# Patient Record
Sex: FEMALE | Race: WHITE | NOT HISPANIC OR LATINO
[De-identification: names, ages, dates, MRNs, and addresses within clinical notes are randomized per-mention and may not be internally consistent; named-entity substitution may affect disease eponyms.]

---

## 2017-03-22 ENCOUNTER — RESULT CHARGE (OUTPATIENT)
Age: 77
End: 2017-03-22

## 2017-03-22 ENCOUNTER — APPOINTMENT (OUTPATIENT)
Dept: UROGYNECOLOGY | Facility: CLINIC | Age: 77
End: 2017-03-22

## 2017-03-22 VITALS
WEIGHT: 145 LBS | DIASTOLIC BLOOD PRESSURE: 78 MMHG | BODY MASS INDEX: 30.44 KG/M2 | SYSTOLIC BLOOD PRESSURE: 124 MMHG | HEIGHT: 58 IN

## 2017-03-22 DIAGNOSIS — Z87.440 PERSONAL HISTORY OF URINARY (TRACT) INFECTIONS: ICD-10-CM

## 2017-03-22 RX ORDER — AZITHROMYCIN 250 MG/1
250 TABLET, FILM COATED ORAL
Qty: 6 | Refills: 0 | Status: DISCONTINUED | COMMUNITY
Start: 2016-10-05

## 2017-03-23 LAB
APPEARANCE: ABNORMAL
BACTERIA: NEGATIVE
BILIRUBIN URINE: NEGATIVE
BLOOD URINE: NEGATIVE
COLOR: YELLOW
GLUCOSE QUALITATIVE U: NORMAL MG/DL
HYALINE CASTS: 0 /LPF
KETONES URINE: NEGATIVE
LEUKOCYTE ESTERASE URINE: NEGATIVE
MICROSCOPIC-UA: NORMAL
NITRITE URINE: NEGATIVE
PH URINE: 6
PROTEIN URINE: NEGATIVE MG/DL
RED BLOOD CELLS URINE: 0 /HPF
SPECIFIC GRAVITY URINE: 1.01
SQUAMOUS EPITHELIAL CELLS: 0 /HPF
UROBILINOGEN URINE: NORMAL MG/DL
WHITE BLOOD CELLS URINE: 0 /HPF

## 2017-03-24 ENCOUNTER — RESULT REVIEW (OUTPATIENT)
Age: 77
End: 2017-03-24

## 2017-03-24 LAB — BACTERIA UR CULT: NORMAL

## 2017-05-31 ENCOUNTER — APPOINTMENT (OUTPATIENT)
Dept: UROGYNECOLOGY | Facility: CLINIC | Age: 77
End: 2017-05-31

## 2017-07-10 ENCOUNTER — APPOINTMENT (OUTPATIENT)
Dept: UROGYNECOLOGY | Facility: CLINIC | Age: 77
End: 2017-07-10

## 2017-07-17 ENCOUNTER — APPOINTMENT (OUTPATIENT)
Dept: UROGYNECOLOGY | Facility: CLINIC | Age: 77
End: 2017-07-17

## 2018-05-07 ENCOUNTER — APPOINTMENT (OUTPATIENT)
Dept: UROGYNECOLOGY | Facility: CLINIC | Age: 78
End: 2018-05-07

## 2018-07-23 ENCOUNTER — APPOINTMENT (OUTPATIENT)
Dept: UROGYNECOLOGY | Facility: CLINIC | Age: 78
End: 2018-07-23

## 2018-11-27 ENCOUNTER — MEDICATION RENEWAL (OUTPATIENT)
Age: 78
End: 2018-11-27

## 2018-12-10 ENCOUNTER — APPOINTMENT (OUTPATIENT)
Dept: UROGYNECOLOGY | Facility: CLINIC | Age: 78
End: 2018-12-10
Payer: MEDICARE

## 2018-12-10 VITALS
DIASTOLIC BLOOD PRESSURE: 84 MMHG | SYSTOLIC BLOOD PRESSURE: 138 MMHG | BODY MASS INDEX: 30.44 KG/M2 | HEIGHT: 58 IN | WEIGHT: 145 LBS

## 2018-12-10 PROCEDURE — 51701 INSERT BLADDER CATHETER: CPT

## 2018-12-10 PROCEDURE — 99213 OFFICE O/P EST LOW 20 MIN: CPT | Mod: 25

## 2018-12-10 RX ORDER — LORAZEPAM 1 MG/1
1 TABLET ORAL
Qty: 90 | Refills: 0 | Status: COMPLETED | COMMUNITY
Start: 2017-02-14 | End: 2018-12-10

## 2018-12-11 ENCOUNTER — RESULT REVIEW (OUTPATIENT)
Age: 78
End: 2018-12-11

## 2018-12-11 LAB
APPEARANCE: CLEAR
BACTERIA: NEGATIVE
BILIRUBIN URINE: NEGATIVE
BLOOD URINE: NEGATIVE
COLOR: YELLOW
GLUCOSE QUALITATIVE U: NEGATIVE MG/DL
KETONES URINE: NEGATIVE
LEUKOCYTE ESTERASE URINE: NEGATIVE
MICROSCOPIC-UA: NORMAL
NITRITE URINE: NEGATIVE
PH URINE: 6.5
PROTEIN URINE: NEGATIVE MG/DL
RED BLOOD CELLS URINE: 0 /HPF
SPECIFIC GRAVITY URINE: 1
SQUAMOUS EPITHELIAL CELLS: 0 /HPF
UROBILINOGEN URINE: NEGATIVE MG/DL
WHITE BLOOD CELLS URINE: 0 /HPF

## 2018-12-12 ENCOUNTER — RESULT REVIEW (OUTPATIENT)
Age: 78
End: 2018-12-12

## 2018-12-12 LAB — BACTERIA UR CULT: NORMAL

## 2019-01-18 ENCOUNTER — RECORD ABSTRACTING (OUTPATIENT)
Age: 79
End: 2019-01-18

## 2019-01-18 DIAGNOSIS — Z86.39 PERSONAL HISTORY OF OTHER ENDOCRINE, NUTRITIONAL AND METABOLIC DISEASE: ICD-10-CM

## 2019-01-31 ENCOUNTER — APPOINTMENT (OUTPATIENT)
Dept: ENDOCRINOLOGY | Facility: CLINIC | Age: 79
End: 2019-01-31
Payer: MEDICARE

## 2019-01-31 VITALS
WEIGHT: 136 LBS | DIASTOLIC BLOOD PRESSURE: 76 MMHG | OXYGEN SATURATION: 98 % | HEART RATE: 70 BPM | SYSTOLIC BLOOD PRESSURE: 118 MMHG | BODY MASS INDEX: 28.55 KG/M2 | HEIGHT: 58 IN

## 2019-01-31 LAB
CHOLEST SERPL-MCNC: 233
GLUCOSE SERPL-MCNC: 75
HBA1C MFR BLD HPLC: 5.6
HDLC SERPL-MCNC: 45
LDLC SERPL DIRECT ASSAY-MCNC: 163

## 2019-01-31 PROCEDURE — 99214 OFFICE O/P EST MOD 30 MIN: CPT

## 2019-01-31 RX ORDER — DICYCLOMINE HYDROCHLORIDE 10 MG/1
10 CAPSULE ORAL
Qty: 50 | Refills: 0 | Status: DISCONTINUED | COMMUNITY
Start: 2017-03-08 | End: 2019-01-31

## 2019-01-31 RX ORDER — LOSARTAN POTASSIUM 25 MG/1
25 TABLET, FILM COATED ORAL
Qty: 90 | Refills: 0 | Status: DISCONTINUED | COMMUNITY
Start: 2018-06-22 | End: 2019-01-31

## 2019-01-31 RX ORDER — OMEPRAZOLE 20 MG/1
20 CAPSULE, DELAYED RELEASE ORAL
Qty: 30 | Refills: 0 | Status: DISCONTINUED | COMMUNITY
Start: 2016-10-06 | End: 2019-01-31

## 2019-01-31 RX ORDER — CEFUROXIME AXETIL 500 MG/1
500 TABLET ORAL
Qty: 10 | Refills: 0 | Status: DISCONTINUED | COMMUNITY
Start: 2018-12-10 | End: 2019-01-31

## 2019-01-31 RX ORDER — AMOXICILLIN 500 MG/1
500 CAPSULE ORAL
Qty: 21 | Refills: 0 | Status: DISCONTINUED | COMMUNITY
Start: 2017-01-16 | End: 2019-01-31

## 2019-01-31 RX ORDER — METHYLPREDNISOLONE 4 MG/1
4 TABLET ORAL
Qty: 21 | Refills: 0 | Status: DISCONTINUED | COMMUNITY
Start: 2018-06-16 | End: 2019-01-31

## 2019-01-31 RX ORDER — HYDROCODONE BITARTRATE AND ACETAMINOPHEN 10; 325 MG/1; MG/1
10-325 TABLET ORAL
Qty: 120 | Refills: 0 | Status: DISCONTINUED | COMMUNITY
Start: 2016-11-21 | End: 2019-01-31

## 2019-01-31 RX ORDER — POLYETHYLENE GLYCOL 3350 17 G/17G
17 POWDER, FOR SOLUTION ORAL
Qty: 527 | Refills: 0 | Status: DISCONTINUED | COMMUNITY
Start: 2017-03-08 | End: 2019-01-31

## 2019-01-31 RX ORDER — TERCONAZOLE 4 MG/G
0.4 CREAM VAGINAL
Qty: 45 | Refills: 0 | Status: DISCONTINUED | COMMUNITY
Start: 2018-11-05 | End: 2019-01-31

## 2019-01-31 RX ORDER — SODIUM SULFATE, POTASSIUM SULFATE, MAGNESIUM SULFATE 17.5; 3.13; 1.6 G/ML; G/ML; G/ML
17.5-3.13-1.6 SOLUTION, CONCENTRATE ORAL
Qty: 354 | Refills: 0 | Status: DISCONTINUED | COMMUNITY
Start: 2018-12-05 | End: 2019-01-31

## 2019-01-31 RX ORDER — CLOBETASOL PROPIONATE 0.5 MG/G
0.05 CREAM TOPICAL
Qty: 60 | Refills: 0 | Status: DISCONTINUED | COMMUNITY
Start: 2016-03-29 | End: 2019-01-31

## 2019-01-31 RX ORDER — CIPROFLOXACIN HYDROCHLORIDE 500 MG/1
500 TABLET, FILM COATED ORAL
Qty: 14 | Refills: 0 | Status: DISCONTINUED | COMMUNITY
Start: 2017-03-16 | End: 2019-01-31

## 2019-01-31 RX ORDER — SULFAMETHOXAZOLE AND TRIMETHOPRIM 400; 80 MG/1; MG/1
400-80 TABLET ORAL
Refills: 0 | Status: DISCONTINUED | COMMUNITY
End: 2019-01-31

## 2019-01-31 RX ORDER — HYDROXYZINE PAMOATE 25 MG/1
25 CAPSULE ORAL
Qty: 30 | Refills: 0 | Status: DISCONTINUED | COMMUNITY
Start: 2018-08-31 | End: 2019-01-31

## 2019-01-31 RX ORDER — FLUTICASONE PROPIONATE 50 UG/1
50 SPRAY, METERED NASAL
Qty: 16 | Refills: 0 | Status: DISCONTINUED | COMMUNITY
Start: 2018-06-16 | End: 2019-01-31

## 2019-01-31 RX ORDER — AMOXICILLIN AND CLAVULANATE POTASSIUM 500; 125 MG/1; MG/1
500-125 TABLET, FILM COATED ORAL
Qty: 20 | Refills: 0 | Status: DISCONTINUED | COMMUNITY
Start: 2018-06-16 | End: 2019-01-31

## 2019-01-31 RX ORDER — NAPROXEN 500 MG/1
500 TABLET ORAL
Qty: 60 | Refills: 0 | Status: DISCONTINUED | COMMUNITY
Start: 2016-09-28 | End: 2019-01-31

## 2019-01-31 RX ORDER — AMOXICILLIN 875 MG/1
875 TABLET, FILM COATED ORAL
Qty: 20 | Refills: 0 | Status: DISCONTINUED | COMMUNITY
Start: 2016-10-05 | End: 2019-01-31

## 2019-02-04 NOTE — PHYSICAL EXAM
[Alert] : alert [No Acute Distress] : no acute distress [Well Nourished] : well nourished [Well Developed] : well developed [Normal Sclera/Conjunctiva] : normal sclera/conjunctiva [EOMI] : extra ocular movement intact [No Proptosis] : no proptosis [Normal Oropharynx] : the oropharynx was normal [Thyroid Not Enlarged] : the thyroid was not enlarged [No Thyroid Nodules] : there were no palpable thyroid nodules [No Respiratory Distress] : no respiratory distress [No Accessory Muscle Use] : no accessory muscle use [Clear to Auscultation] : lungs were clear to auscultation bilaterally [Normal Rate] : heart rate was normal  [Normal S1, S2] : normal S1 and S2 [Regular Rhythm] : with a regular rhythm [Pedal Pulses Normal] : the pedal pulses are present [No Edema] : there was no peripheral edema [Post Cervical Nodes] : posterior cervical nodes [Anterior Cervical Nodes] : anterior cervical nodes [Normal] : normal and non tender [No Stigmata of Cushings Syndrome] : no stigmata of cushings syndrome [Normal Gait] : normal gait [Normal Strength/Tone] : muscle strength and tone were normal [No Rash] : no rash [Acanthosis Nigricans] : no acanthosis nigricans [Normal Reflexes] : deep tendon reflexes were 2+ and symmetric [No Tremors] : no tremors [Oriented x3] : oriented to person, place, and time

## 2019-02-04 NOTE — ASSESSMENT
[FreeTextEntry1] : Addisons diseae - well controlled - for any surgica procdure will need stress dose steroids IV \par  reviewd sick day rules with pt- if cannot holddown HC pills- needs to go to ER \par  otehrwise formild- illness - doublw seroids for a few days until feels better then go back to ususal dosing \par \par Hypothryod Cont RX \par \par high chol- start chol med- illdo  it this time

## 2019-02-18 ENCOUNTER — RX RENEWAL (OUTPATIENT)
Age: 79
End: 2019-02-18

## 2019-02-18 ENCOUNTER — MEDICATION RENEWAL (OUTPATIENT)
Age: 79
End: 2019-02-18

## 2019-07-19 ENCOUNTER — APPOINTMENT (OUTPATIENT)
Dept: ENDOCRINOLOGY | Facility: CLINIC | Age: 79
End: 2019-07-19
Payer: MEDICARE

## 2019-07-19 VITALS
HEIGHT: 58 IN | WEIGHT: 143 LBS | DIASTOLIC BLOOD PRESSURE: 80 MMHG | HEART RATE: 74 BPM | BODY MASS INDEX: 30.02 KG/M2 | SYSTOLIC BLOOD PRESSURE: 130 MMHG

## 2019-07-19 DIAGNOSIS — Z87.09 PERSONAL HISTORY OF OTHER DISEASES OF THE RESPIRATORY SYSTEM: ICD-10-CM

## 2019-07-19 PROCEDURE — 99214 OFFICE O/P EST MOD 30 MIN: CPT

## 2019-07-22 ENCOUNTER — APPOINTMENT (OUTPATIENT)
Dept: ENDOCRINOLOGY | Facility: CLINIC | Age: 79
End: 2019-07-22

## 2019-07-22 NOTE — ASSESSMENT
[FreeTextEntry1] : Addisons diseae - well controlled - for any surgica procdure will need stress dose steroids IV \par  reviewd sick day rules with pt- if cannot holddown HC pills- needs to go to ER \par  otherwise for mild- illness - double steroids for a few days until feels better then go back to ususal dosing \par \par Hypothryod Cont RX \par \par high chol- start chol med- will actually take it this time\par \par sinusitis - add amoxicillin -  will follow up with ENT and PMD

## 2019-07-22 NOTE — HISTORY OF PRESENT ILLNESS
[FreeTextEntry1] : here for follow up Hypothryoidsm,Addisons disease \par \par Pt has been busy taking care of grandkids \par \par still needs to get pelvic surgery - afraid of surgery\par \par Pt feels well HC 10 in Am and 5 mg in PM \par  \par \par n seen by ENT and Neruology for vertigo\par  feelslike sinusses are acting up now

## 2019-07-22 NOTE — PHYSICAL EXAM
[Alert] : alert [No Acute Distress] : no acute distress [Well Nourished] : well nourished [Well Developed] : well developed [EOMI] : extra ocular movement intact [Normal Sclera/Conjunctiva] : normal sclera/conjunctiva [Thyroid Not Enlarged] : the thyroid was not enlarged [No Accessory Muscle Use] : no accessory muscle use [No Respiratory Distress] : no respiratory distress [No Thyroid Nodules] : there were no palpable thyroid nodules [Clear to Auscultation] : lungs were clear to auscultation bilaterally [Normal S1, S2] : normal S1 and S2 [Normal Rate] : heart rate was normal  [No Edema] : there was no peripheral edema [Pedal Pulses Normal] : the pedal pulses are present [Regular Rhythm] : with a regular rhythm [Anterior Cervical Nodes] : anterior cervical nodes [Post Cervical Nodes] : posterior cervical nodes [No Stigmata of Cushings Syndrome] : no stigmata of cushings syndrome [Normal] : normal and non tender [Normal Strength/Tone] : muscle strength and tone were normal [No Rash] : no rash [Normal Gait] : normal gait [No Tremors] : no tremors [Normal Reflexes] : deep tendon reflexes were 2+ and symmetric [Acanthosis Nigricans] : no acanthosis nigricans [Oriented x3] : oriented to person, place, and time

## 2019-07-23 ENCOUNTER — CLINICAL ADVICE (OUTPATIENT)
Age: 79
End: 2019-07-23

## 2019-07-26 LAB
25(OH)D3 SERPL-MCNC: 28.6 NG/ML
ALBUMIN SERPL ELPH-MCNC: 4.3 G/DL
ALP BLD-CCNC: 73 U/L
ALT SERPL-CCNC: 23 U/L
ANION GAP SERPL CALC-SCNC: 14 MMOL/L
AST SERPL-CCNC: 29 U/L
BASOPHILS # BLD AUTO: 0.09 K/UL
BASOPHILS NFR BLD AUTO: 0.9 %
BILIRUB SERPL-MCNC: 0.4 MG/DL
BUN SERPL-MCNC: 11 MG/DL
CALCIUM SERPL-MCNC: 9.8 MG/DL
CHLORIDE SERPL-SCNC: 100 MMOL/L
CHOLEST SERPL-MCNC: 252 MG/DL
CHOLEST/HDLC SERPL: 5.6 RATIO
CO2 SERPL-SCNC: 24 MMOL/L
CREAT SERPL-MCNC: 0.86 MG/DL
EOSINOPHIL # BLD AUTO: 0.98 K/UL
EOSINOPHIL NFR BLD AUTO: 9.4 %
ESTIMATED AVERAGE GLUCOSE: 126 MG/DL
GLUCOSE SERPL-MCNC: 76 MG/DL
HBA1C MFR BLD HPLC: 6 %
HCT VFR BLD CALC: 47.9 %
HDLC SERPL-MCNC: 45 MG/DL
HGB BLD-MCNC: 15.3 G/DL
IMM GRANULOCYTES NFR BLD AUTO: 0.2 %
LDLC SERPL CALC-MCNC: 168 MG/DL
LYMPHOCYTES # BLD AUTO: 4.37 K/UL
LYMPHOCYTES NFR BLD AUTO: 41.9 %
MAGNESIUM SERPL-MCNC: 1.9 MG/DL
MAN DIFF?: NORMAL
MCHC RBC-ENTMCNC: 30.1 PG
MCHC RBC-ENTMCNC: 31.9 GM/DL
MCV RBC AUTO: 94.1 FL
MONOCYTES # BLD AUTO: 0.81 K/UL
MONOCYTES NFR BLD AUTO: 7.8 %
NEUTROPHILS # BLD AUTO: 4.16 K/UL
NEUTROPHILS NFR BLD AUTO: 39.8 %
PLATELET # BLD AUTO: 633 K/UL
POTASSIUM SERPL-SCNC: 4.4 MMOL/L
PROT SERPL-MCNC: 7.4 G/DL
RBC # BLD: 5.09 M/UL
RBC # FLD: 13.8 %
SODIUM SERPL-SCNC: 138 MMOL/L
T4 FREE SERPL-MCNC: 1.8 NG/DL
TRIGL SERPL-MCNC: 196 MG/DL
TSH SERPL-ACNC: 0.76 UIU/ML
VIT B12 SERPL-MCNC: 350 PG/ML
WBC # FLD AUTO: 10.43 K/UL

## 2019-09-30 ENCOUNTER — APPOINTMENT (OUTPATIENT)
Dept: UROGYNECOLOGY | Facility: CLINIC | Age: 79
End: 2019-09-30
Payer: MEDICARE

## 2019-09-30 VITALS — DIASTOLIC BLOOD PRESSURE: 80 MMHG | SYSTOLIC BLOOD PRESSURE: 141 MMHG

## 2019-09-30 LAB
BILIRUB UR QL STRIP: NEGATIVE
CLARITY UR: CLEAR
COLLECTION METHOD: NORMAL
GLUCOSE UR-MCNC: NEGATIVE
HCG UR QL: 0.2 EU/DL
HGB UR QL STRIP.AUTO: NEGATIVE
KETONES UR-MCNC: NEGATIVE
LEUKOCYTE ESTERASE UR QL STRIP: NEGATIVE
NITRITE UR QL STRIP: NEGATIVE
PH UR STRIP: 6
PROT UR STRIP-MCNC: NEGATIVE
SP GR UR STRIP: 1.01

## 2019-09-30 PROCEDURE — 99213 OFFICE O/P EST LOW 20 MIN: CPT | Mod: 25

## 2019-09-30 PROCEDURE — 81003 URINALYSIS AUTO W/O SCOPE: CPT | Mod: QW

## 2019-09-30 PROCEDURE — 51701 INSERT BLADDER CATHETER: CPT

## 2019-09-30 NOTE — HISTORY OF PRESENT ILLNESS
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 She had Urodynamics testing 10/21/15 which was positive for OAB. She states her health has been uneventful since she was last visit.  She did poorly with Cipro and does not want to take it again.  She states she is disgusted with her prolapse and wants to proceed with surgery.  She does not want a pessary.\par \par

## 2019-09-30 NOTE — DISCUSSION/SUMMARY
[FreeTextEntry1] : Will schedule an appt with Dr Tuttle, ordered a pelvic US.  Urine obtained with a catheter for accuracy and sent to the lab for testing.  Will await today's results to consider treatment.

## 2019-09-30 NOTE — PROCEDURE
[Patient] : the patient [Straight Catheterization] : insertion of a straight catheter [Urinary Tract Infection] : a urinary tract infection [None] : there were no complications with the catheter insertion [___ Fr Straight Tip] : a [unfilled] in [place] straight tip catheter [Culture] : culture [Clear] : clear [Cytology] : cytology [No Complications] : no complications [Post procedure instructions and information given] : Post procedure instructions and information were given and reviewed with patient. [Tolerated Well] : the patient tolerated the procedure well [0] : 0

## 2019-09-30 NOTE — CHIEF COMPLAINT
[Poor Bladder Control] : poor bladder control [Falling Pelvic Organs] : falling pelvic organs [Urine Frequency] : urine frequency

## 2019-10-01 LAB
APPEARANCE: CLEAR
BACTERIA: NEGATIVE
BILIRUBIN URINE: NEGATIVE
BLOOD URINE: NEGATIVE
COLOR: NORMAL
GLUCOSE QUALITATIVE U: NEGATIVE
HYALINE CASTS: 0 /LPF
KETONES URINE: NEGATIVE
LEUKOCYTE ESTERASE URINE: NEGATIVE
MICROSCOPIC-UA: NORMAL
NITRITE URINE: NEGATIVE
PH URINE: 6
PROTEIN URINE: NEGATIVE
RED BLOOD CELLS URINE: 0 /HPF
SPECIFIC GRAVITY URINE: 1.01
SQUAMOUS EPITHELIAL CELLS: 0 /HPF
UROBILINOGEN URINE: NORMAL
WHITE BLOOD CELLS URINE: 0 /HPF

## 2019-10-02 ENCOUNTER — RESULT REVIEW (OUTPATIENT)
Age: 79
End: 2019-10-02

## 2019-10-02 LAB — BACTERIA UR CULT: NORMAL

## 2019-10-04 ENCOUNTER — RESULT CHARGE (OUTPATIENT)
Age: 79
End: 2019-10-04

## 2019-10-04 ENCOUNTER — APPOINTMENT (OUTPATIENT)
Dept: UROGYNECOLOGY | Facility: CLINIC | Age: 79
End: 2019-10-04
Payer: MEDICARE

## 2019-10-04 VITALS — SYSTOLIC BLOOD PRESSURE: 130 MMHG | DIASTOLIC BLOOD PRESSURE: 80 MMHG

## 2019-10-04 LAB
BILIRUB UR QL STRIP: NEGATIVE
CLARITY UR: CLEAR
COLLECTION METHOD: NORMAL
GLUCOSE UR-MCNC: NEGATIVE
HCG UR QL: 0.2 EU/DL
HGB UR QL STRIP.AUTO: NORMAL
KETONES UR-MCNC: NEGATIVE
LEUKOCYTE ESTERASE UR QL STRIP: NEGATIVE
NITRITE UR QL STRIP: NEGATIVE
PH UR STRIP: 6.5
PROT UR STRIP-MCNC: NEGATIVE
SP GR UR STRIP: <=1.005

## 2019-10-04 PROCEDURE — 99213 OFFICE O/P EST LOW 20 MIN: CPT

## 2019-10-04 PROCEDURE — 51798 US URINE CAPACITY MEASURE: CPT

## 2019-10-04 PROCEDURE — 81003 URINALYSIS AUTO W/O SCOPE: CPT | Mod: QW

## 2019-10-04 NOTE — DISCUSSION/SUMMARY
[FreeTextEntry1] : Needs appt with Dr Tuttle and pelvic US.  Does not want another pessary, might come back on Monday for a fitting, doesn't want a new pessary for the weekend incase of problem.  Given Zinc Oxide cream and instructed to buy Desitin if she runs out.  Use barrier medication to protect prolapse and around anus and small thrombosed hemorrhoid.

## 2019-10-04 NOTE — HISTORY OF PRESENT ILLNESS
[FreeTextEntry1] : This 80 yo woman was [previously seen 6/11/15 with total procidentia, tried a pessary which she had removed the next day due to increased incontinence. She had Urodynamics testing 10/21/15 which was positive for OAB. She states her health has been uneventful since she was last visit. She did poorly with Cipro and does not want to take it again. She states she is disgusted with her prolapse and wants to proceed with surgery. She returns today for relief of discomfort of the actual prolapse.  She uses Vagisil and it is not helping.  Last mammogram many years ago.  Does not get regular health care.

## 2019-10-04 NOTE — PHYSICAL EXAM
[Labia Majora] : were normal [Labia Minora] : were normal [Normal] : was normal [Bartholin's Gland] : both Bartholin's glands were normal  [Cystocele] : a cystocele [Uterine Prolapse] : uterine prolapse [No Bleeding] : there was no active vaginal bleeding [Attentuated] : perineal body was attenuated [Mild] : mild [] : III [External Hemorrhoid] : external hemorrhoid was present [de-identified] : atrophic [de-identified] : difficult to palpate

## 2019-10-22 ENCOUNTER — APPOINTMENT (OUTPATIENT)
Dept: UROGYNECOLOGY | Facility: CLINIC | Age: 79
End: 2019-10-22

## 2019-10-25 ENCOUNTER — APPOINTMENT (OUTPATIENT)
Dept: UROGYNECOLOGY | Facility: CLINIC | Age: 79
End: 2019-10-25
Payer: MEDICARE

## 2019-10-25 VITALS — SYSTOLIC BLOOD PRESSURE: 130 MMHG | DIASTOLIC BLOOD PRESSURE: 90 MMHG

## 2019-10-25 DIAGNOSIS — N90.89 OTHER SPECIFIED NONINFLAMMATORY DISORDERS OF VULVA AND PERINEUM: ICD-10-CM

## 2019-10-25 DIAGNOSIS — L29.2 PRURITUS VULVAE: ICD-10-CM

## 2019-10-25 LAB
BILIRUB UR QL STRIP: NEGATIVE
CLARITY UR: CLEAR
COLLECTION METHOD: NORMAL
GLUCOSE UR-MCNC: NEGATIVE
HCG UR QL: 0.2 EU/DL
HGB UR QL STRIP.AUTO: NEGATIVE
KETONES UR-MCNC: NEGATIVE
LEUKOCYTE ESTERASE UR QL STRIP: NEGATIVE
NITRITE UR QL STRIP: NEGATIVE
PH UR STRIP: 5.5
PROT UR STRIP-MCNC: NEGATIVE
SP GR UR STRIP: <=1.005

## 2019-10-25 PROCEDURE — 99213 OFFICE O/P EST LOW 20 MIN: CPT

## 2019-10-25 PROCEDURE — 81003 URINALYSIS AUTO W/O SCOPE: CPT | Mod: QW

## 2019-10-25 PROCEDURE — 51798 US URINE CAPACITY MEASURE: CPT

## 2019-10-25 NOTE — PHYSICAL EXAM
[Vulvar Atrophy] : vulvar atrophy [de-identified] : tender to touch about 1 cm inside introitus at 3:00, no lesion or erythema noted.

## 2019-10-25 NOTE — HISTORY OF PRESENT ILLNESS
[FreeTextEntry1] : This 80 yo woman was [previously seen 6/11/15 with total procidentia, tried a pessary which she had removed the next day due to increased incontinence. She had Urodynamics testing 10/21/15 which was positive for OAB. She states her health has been uneventful since she was last visit. She did poorly with Cipro and does not want to take it again. She states she is disgusted with her prolapse and wants to proceed with surgery. She returns today for relief of discomfort of the actual prolapse. She uses Vagisil and it is not helping. Last mammogram many years ago. Does not get regular health care. \par Currently, C/O itching and pain with urination on the left side of her introitus.  She was asked to use a barrier cream to shield delicate tissues and states she is not using it, doesn't like it.

## 2019-10-25 NOTE — DISCUSSION/SUMMARY
[FreeTextEntry1] : No obvious lesion or erythema visible, tender to touch 3:00 1 cm inside vagina.  Won't use barrier cream.  will Rx Mycolog cream with instructions for use.  Does not want a pessary today, states she is too uncomfortable, even though reducing the prolapse might be helpful to her symptoms.  Recent refusal to have colonoscopy and is waiting for results of Stool Sample.  She states she was told that she probably has IBS.

## 2019-11-22 ENCOUNTER — APPOINTMENT (OUTPATIENT)
Dept: UROGYNECOLOGY | Facility: CLINIC | Age: 79
End: 2019-11-22

## 2020-01-15 ENCOUNTER — RX RENEWAL (OUTPATIENT)
Age: 80
End: 2020-01-15

## 2020-01-23 ENCOUNTER — APPOINTMENT (OUTPATIENT)
Dept: UROGYNECOLOGY | Facility: CLINIC | Age: 80
End: 2020-01-23
Payer: MEDICARE

## 2020-01-23 LAB
BILIRUB UR QL STRIP: NORMAL
GLUCOSE UR-MCNC: NEGATIVE
HCG UR QL: 0.2 EU/DL
HGB UR QL STRIP.AUTO: NEGATIVE
KETONES UR-MCNC: NEGATIVE
LEUKOCYTE ESTERASE UR QL STRIP: NEGATIVE
NITRITE UR QL STRIP: NEGATIVE
PH UR STRIP: 6
PROT UR STRIP-MCNC: NEGATIVE
SP GR UR STRIP: 1

## 2020-01-23 PROCEDURE — 99213 OFFICE O/P EST LOW 20 MIN: CPT

## 2020-01-23 PROCEDURE — 81003 URINALYSIS AUTO W/O SCOPE: CPT | Mod: QW

## 2020-01-23 NOTE — DISCUSSION/SUMMARY
[FreeTextEntry1] : Linda states she is finally ready to have surgery.  She brought a brief note from [facility] and has preliminary clearance to proceed.  She has an US on the chart.  She will schedule repeat UroD and an exam with Dr Tuttle to move surgery along.

## 2020-01-23 NOTE — HISTORY OF PRESENT ILLNESS
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 She had Urodynamics testing 10/21/15 which was positive for OAB. She presents today with a brief handwritten note from [facility] indicating that she could have surgery is she desires, dated 1/20/10 and that she needs to return in 6 months for followup. UroD 2015, needs repeat.  Failed pessary due to incontinence.\par She has a very large prolapse and would likely be a candidate for a Lefort Colpocliesis.\par

## 2020-01-24 ENCOUNTER — APPOINTMENT (OUTPATIENT)
Dept: ENDOCRINOLOGY | Facility: CLINIC | Age: 80
End: 2020-01-24

## 2020-01-24 ENCOUNTER — APPOINTMENT (OUTPATIENT)
Dept: UROGYNECOLOGY | Facility: CLINIC | Age: 80
End: 2020-01-24
Payer: MEDICARE

## 2020-01-24 PROCEDURE — 51784 ANAL/URINARY MUSCLE STUDY: CPT

## 2020-01-24 PROCEDURE — 51741 ELECTRO-UROFLOWMETRY FIRST: CPT

## 2020-01-24 PROCEDURE — 51729 CYSTOMETROGRAM W/VP&UP: CPT

## 2020-01-24 PROCEDURE — 51797 INTRAABDOMINAL PRESSURE TEST: CPT

## 2020-01-27 ENCOUNTER — APPOINTMENT (OUTPATIENT)
Dept: UROGYNECOLOGY | Facility: CLINIC | Age: 80
End: 2020-01-27

## 2020-01-27 ENCOUNTER — APPOINTMENT (OUTPATIENT)
Dept: UROGYNECOLOGY | Facility: CLINIC | Age: 80
End: 2020-01-27
Payer: MEDICARE

## 2020-01-27 PROCEDURE — 99213 OFFICE O/P EST LOW 20 MIN: CPT

## 2020-01-29 ENCOUNTER — APPOINTMENT (OUTPATIENT)
Dept: UROGYNECOLOGY | Facility: CLINIC | Age: 80
End: 2020-01-29
Payer: MEDICARE

## 2020-01-29 PROCEDURE — 99214 OFFICE O/P EST MOD 30 MIN: CPT

## 2020-01-29 NOTE — PHYSICAL EXAM
[Warm and Dry] : was warm and dry to touch [Normal Gait] : gait was normal [Vulvar Atrophy] : vulvar atrophy [Labia Majora] : were normal [Labia Minora] : were normal [Bartholin's Gland] : both Bartholin's glands were normal  [Atrophy] : atrophy [No Bleeding] : there was no active vaginal bleeding [Attentuated] : perineal body was attenuated [] : IV [Normal] : normal [Soft] :  the cervix was soft [Uterine Adnexae] : were not tender and not enlarged [Exam Deferred] : was deferred [Mass (___ Cm)] : no ~M [unfilled] abdominal mass was palpated [Performed?] : was not performed

## 2020-01-29 NOTE — HISTORY OF PRESENT ILLNESS
[FreeTextEntry1] : HEre to discuss options. UDTs showed leaking. U/S showed small uterus, but could not clearly see lining. She denies any post menopausal bleeding. She got cleared from her cardiologist.

## 2020-01-29 NOTE — HISTORY OF PRESENT ILLNESS
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 She had Urodynamics testing 10/21/15 which was positive for OAB. She presents today with a brief handwritten note from [facility] indicating that she could have surgery is she desires, dated 1/20/10 and that she needs to return in 6 months for followup. \par UroD 1/2020, + LIBERTAD. Failed pessary due to incontinence.\par She has a very large prolapse and would likely be a candidate for a Lefort Colpocleisis.  She presents today to see Dr Tuttle to arrange surgery.\par \par

## 2020-01-29 NOTE — ADDENDUM
[FreeTextEntry1] : Patient seen and examined. Complete procidentia. U/S unable to visualize lining. No bleeding. Would recommend vag hyst and closure and sling. Returning for consent.

## 2020-02-05 ENCOUNTER — APPOINTMENT (OUTPATIENT)
Dept: ENDOCRINOLOGY | Facility: CLINIC | Age: 80
End: 2020-02-05
Payer: MEDICARE

## 2020-02-05 VITALS
HEART RATE: 76 BPM | SYSTOLIC BLOOD PRESSURE: 122 MMHG | BODY MASS INDEX: 30.02 KG/M2 | WEIGHT: 143 LBS | HEIGHT: 58 IN | DIASTOLIC BLOOD PRESSURE: 78 MMHG

## 2020-02-05 PROCEDURE — 99214 OFFICE O/P EST MOD 30 MIN: CPT

## 2020-02-05 NOTE — REVIEW OF SYSTEMS
[Dry Skin] : dry skin [Headache] : headaches [Anxiety] : anxiety [Fatigue] : no fatigue [Decreased Appetite] : appetite not decreased [Visual Field Defect] : no visual field defect [Recent Weight Loss (___ Lbs)] : no recent weight loss [Recent Weight Gain (___ Lbs)] : no recent weight gain [Blurry Vision] : no blurred vision [Dysphagia] : no dysphagia [Neck Pain] : no neck pain [Chest Pain] : no chest pain [Dysphonia] : no dysphonia [Palpitations] : no palpitations [Hair Loss] : no hair loss [Cold Intolerance] : cold tolerant [Tremors] : no tremors [Depression] : no depression [Heat Intolerance] : heat tolerant [Cushing's Stigmata] : no Cushing's stigmata [Easy Bruising] : no tendency for easy bruising [Swelling] : no swelling [FreeTextEntry3] : needs glasses [FreeTextEntry5] : followed by cardiology  [FreeTextEntry2] : weight stable  [de-identified] : hx of migraine

## 2020-02-05 NOTE — PHYSICAL EXAM
[Alert] : alert [Well Nourished] : well nourished [No Acute Distress] : no acute distress [Normal Sclera/Conjunctiva] : normal sclera/conjunctiva [Well Developed] : well developed [Supple] : the neck was supple [EOMI] : extra ocular movement intact [Thyroid Not Enlarged] : the thyroid was not enlarged [No LAD] : no lymphadenopathy [No Accessory Muscle Use] : no accessory muscle use [Normal Rate and Effort] : normal respiratory rhythm and effort [No Thyroid Nodules] : there were no palpable thyroid nodules [Clear to Auscultation] : lungs were clear to auscultation bilaterally [Normal Rate] : heart rate was normal  [Not Tender] : non-tender [Normal S1, S2] : normal S1 and S2 [Normal Bowel Sounds] : normal bowel sounds [Regular Rhythm] : with a regular rhythm [Acanthosis Nigricans] : no acanthosis nigricans [Soft] : abdomen soft [No Rash] : no rash [No Tremors] : no tremors [Normal Insight/Judgement] : insight and judgment were intact [Oriented x3] : oriented to person, place, and time [Normal Mood] : the mood was normal

## 2020-02-05 NOTE — ASSESSMENT
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

## 2020-02-05 NOTE — REASON FOR VISIT
[Follow-Up: _____] : a [unfilled] follow-up visit [FreeTextEntry1] : [place]'s disease, Hypothyroidism, and HLD

## 2020-02-05 NOTE — HISTORY OF PRESENT ILLNESS
[FreeTextEntry1] : Pt. having a bladder lift on 4/3/2020 with Dr. Foley. \par \par Quality: Orrtanna's Disease\par Severity: moderate\par Duration: over 13 years\par Onset: weight loss and labs \par Modifying Factors: Better with medication \par Associated Symptoms: denies fatigue, dizziness, darkening of the skin, no low blood pressure \par \par Notes:\par \par \par Current Regimen:\par  HC 10 in Am and 5 mg in PM \par - LT4 88 mcg daily- taking appropriately\par \par Labs reviewed: No recent labs \par \par

## 2020-02-06 LAB
25(OH)D3 SERPL-MCNC: 27.9 NG/ML
ACTH SER-ACNC: 29.7 PG/ML
ALBUMIN SERPL ELPH-MCNC: 4.3 G/DL
ALP BLD-CCNC: 76 U/L
ALT SERPL-CCNC: 26 U/L
ANION GAP SERPL CALC-SCNC: 12 MMOL/L
APPEARANCE: CLEAR
AST SERPL-CCNC: 26 U/L
BASOPHILS # BLD AUTO: 0.11 K/UL
BASOPHILS NFR BLD AUTO: 1.1 %
BILIRUB SERPL-MCNC: 0.4 MG/DL
BILIRUBIN URINE: NEGATIVE
BLOOD URINE: NEGATIVE
BUN SERPL-MCNC: 18 MG/DL
CALCIUM SERPL-MCNC: 10 MG/DL
CHLORIDE SERPL-SCNC: 105 MMOL/L
CHOLEST SERPL-MCNC: 247 MG/DL
CHOLEST/HDLC SERPL: 5.6 RATIO
CO2 SERPL-SCNC: 23 MMOL/L
COLOR: COLORLESS
CREAT SERPL-MCNC: 0.91 MG/DL
CREAT SPEC-SCNC: 10 MG/DL
EOSINOPHIL # BLD AUTO: 0.44 K/UL
EOSINOPHIL NFR BLD AUTO: 4.5 %
ESTIMATED AVERAGE GLUCOSE: 123 MG/DL
GLUCOSE QUALITATIVE U: NEGATIVE
GLUCOSE SERPL-MCNC: 91 MG/DL
HBA1C MFR BLD HPLC: 5.9 %
HCT VFR BLD CALC: 46.1 %
HDLC SERPL-MCNC: 44 MG/DL
HGB BLD-MCNC: 15.2 G/DL
IMM GRANULOCYTES NFR BLD AUTO: 0.3 %
KETONES URINE: NEGATIVE
LDLC SERPL CALC-MCNC: 158 MG/DL
LEUKOCYTE ESTERASE URINE: NEGATIVE
LYMPHOCYTES # BLD AUTO: 2.64 K/UL
LYMPHOCYTES NFR BLD AUTO: 27.2 %
MAGNESIUM SERPL-MCNC: 1.9 MG/DL
MAN DIFF?: NORMAL
MCHC RBC-ENTMCNC: 30.2 PG
MCHC RBC-ENTMCNC: 33 GM/DL
MCV RBC AUTO: 91.7 FL
MICROALBUMIN 24H UR DL<=1MG/L-MCNC: <1.2 MG/DL
MICROALBUMIN/CREAT 24H UR-RTO: NORMAL MG/G
MONOCYTES # BLD AUTO: 0.47 K/UL
MONOCYTES NFR BLD AUTO: 4.8 %
NEUTROPHILS # BLD AUTO: 6.02 K/UL
NEUTROPHILS NFR BLD AUTO: 62.1 %
NITRITE URINE: NEGATIVE
PH URINE: 5.5
PHOSPHATE SERPL-MCNC: 3.5 MG/DL
PLATELET # BLD AUTO: 494 K/UL
POTASSIUM SERPL-SCNC: 4.2 MMOL/L
PROT SERPL-MCNC: 7.3 G/DL
PROTEIN URINE: NEGATIVE
RBC # BLD: 5.03 M/UL
RBC # FLD: 13.5 %
SODIUM SERPL-SCNC: 140 MMOL/L
SPECIFIC GRAVITY URINE: 1
T3FREE SERPL-MCNC: 2.8 PG/ML
T4 FREE SERPL-MCNC: 1.5 NG/DL
TRIGL SERPL-MCNC: 222 MG/DL
TSH SERPL-ACNC: 0.33 UIU/ML
UROBILINOGEN URINE: NORMAL
VIT B12 SERPL-MCNC: 319 PG/ML
WBC # FLD AUTO: 9.71 K/UL

## 2020-02-14 ENCOUNTER — APPOINTMENT (OUTPATIENT)
Dept: ENDOCRINOLOGY | Facility: CLINIC | Age: 80
End: 2020-02-14

## 2020-04-17 ENCOUNTER — APPOINTMENT (OUTPATIENT)
Dept: UROGYNECOLOGY | Facility: CLINIC | Age: 80
End: 2020-04-17
Payer: MEDICARE

## 2020-04-17 PROCEDURE — 99213 OFFICE O/P EST LOW 20 MIN: CPT | Mod: 95

## 2020-04-17 NOTE — HISTORY OF PRESENT ILLNESS
[Medical Office: ([facility])___] : at the medical office located in  [Patient] : the patient [Self] : self [FreeTextEntry2] : Linda Nieto [FreeTextEntry1] : Linda just had blood work and her primary told her her kidney function is not great but it is stable and that she should have the prolapse repair sooner than later. I discussed with Linda and her daughter the mechanism of decreased kidney function with severe prolapse and how the pessary would lift up her bladder and hopefully help. She tried a pessary and was very unhappy with it and does not want to try that again. She got cleared by her cardiologist. She is very bothered by the prolapse. She was scheduled for surgery but really wants to get it done but it worried about having it with the current pandemic.

## 2020-04-17 NOTE — DISCUSSION/SUMMARY
[FreeTextEntry1] : We discussed options. We reviewed the procedure we were planning on doing and we talked about the recovery and that I would want her to stay overnight for observation post op. We also discussed that because of the effect on her kidneys that she becomes more urgent to complete, not an emergency, but she moves to the top of the list when we resume scheduling cases. I was able to answer all of her questions.

## 2020-04-20 ENCOUNTER — APPOINTMENT (OUTPATIENT)
Dept: UROGYNECOLOGY | Facility: CLINIC | Age: 80
End: 2020-04-20

## 2020-05-05 ENCOUNTER — APPOINTMENT (OUTPATIENT)
Dept: UROGYNECOLOGY | Facility: HOSPITAL | Age: 80
End: 2020-05-05

## 2020-05-18 ENCOUNTER — APPOINTMENT (OUTPATIENT)
Dept: UROGYNECOLOGY | Facility: CLINIC | Age: 80
End: 2020-05-18

## 2020-06-01 ENCOUNTER — OUTPATIENT (OUTPATIENT)
Dept: OUTPATIENT SERVICES | Facility: HOSPITAL | Age: 80
LOS: 1 days | End: 2020-06-01
Payer: MEDICARE

## 2020-06-02 ENCOUNTER — APPOINTMENT (OUTPATIENT)
Dept: UROGYNECOLOGY | Facility: CLINIC | Age: 80
End: 2020-06-02
Payer: MEDICARE

## 2020-06-02 DIAGNOSIS — N39.3 STRESS INCONTINENCE (FEMALE) (MALE): ICD-10-CM

## 2020-06-02 PROCEDURE — 99214 OFFICE O/P EST MOD 30 MIN: CPT

## 2020-06-02 NOTE — DISCUSSION/SUMMARY
[FreeTextEntry1] : We discussed everything that we talked about on Jan 29th. i showed her pictures on the computer and went through the full informed consent, r/b/a. She would like to proceed with surgery. She agreed to TVh colpectomy, sling and cysto. I was able to answer all of her questions.

## 2020-06-02 NOTE — HISTORY OF PRESENT ILLNESS
[FreeTextEntry1] : Here for re-consent. Had full informed consent in January and then covid happened.

## 2020-06-08 ENCOUNTER — OUTPATIENT (OUTPATIENT)
Dept: OUTPATIENT SERVICES | Facility: HOSPITAL | Age: 80
LOS: 1 days | End: 2020-06-08
Payer: MEDICARE

## 2020-06-08 VITALS
TEMPERATURE: 98 F | HEIGHT: 63 IN | RESPIRATION RATE: 16 BRPM | WEIGHT: 138.23 LBS | DIASTOLIC BLOOD PRESSURE: 82 MMHG | HEART RATE: 67 BPM | SYSTOLIC BLOOD PRESSURE: 140 MMHG

## 2020-06-08 DIAGNOSIS — Z29.9 ENCOUNTER FOR PROPHYLACTIC MEASURES, UNSPECIFIED: ICD-10-CM

## 2020-06-08 DIAGNOSIS — Z01.818 ENCOUNTER FOR OTHER PREPROCEDURAL EXAMINATION: ICD-10-CM

## 2020-06-08 DIAGNOSIS — I10 ESSENTIAL (PRIMARY) HYPERTENSION: ICD-10-CM

## 2020-06-08 DIAGNOSIS — N81.2 INCOMPLETE UTEROVAGINAL PROLAPSE: ICD-10-CM

## 2020-06-08 LAB
A1C WITH ESTIMATED AVERAGE GLUCOSE RESULT: 6 % — HIGH (ref 4–5.6)
ANION GAP SERPL CALC-SCNC: 10 MMOL/L — SIGNIFICANT CHANGE UP (ref 5–17)
APPEARANCE UR: CLEAR — SIGNIFICANT CHANGE UP
BASOPHILS # BLD AUTO: 0.11 K/UL — SIGNIFICANT CHANGE UP (ref 0–0.2)
BASOPHILS NFR BLD AUTO: 1 % — SIGNIFICANT CHANGE UP (ref 0–2)
BILIRUB UR-MCNC: NEGATIVE — SIGNIFICANT CHANGE UP
BLD GP AB SCN SERPL QL: SIGNIFICANT CHANGE UP
BUN SERPL-MCNC: 20 MG/DL — SIGNIFICANT CHANGE UP (ref 8–20)
CALCIUM SERPL-MCNC: 9.7 MG/DL — SIGNIFICANT CHANGE UP (ref 8.6–10.2)
CHLORIDE SERPL-SCNC: 95 MMOL/L — LOW (ref 98–107)
CO2 SERPL-SCNC: 28 MMOL/L — SIGNIFICANT CHANGE UP (ref 22–29)
COLOR SPEC: YELLOW — SIGNIFICANT CHANGE UP
CREAT SERPL-MCNC: 0.97 MG/DL — SIGNIFICANT CHANGE UP (ref 0.5–1.3)
DIFF PNL FLD: NEGATIVE — SIGNIFICANT CHANGE UP
EOSINOPHIL # BLD AUTO: 0.75 K/UL — HIGH (ref 0–0.5)
EOSINOPHIL NFR BLD AUTO: 6.9 % — HIGH (ref 0–6)
ESTIMATED AVERAGE GLUCOSE: 126 MG/DL — HIGH (ref 68–114)
GLUCOSE SERPL-MCNC: 60 MG/DL — LOW (ref 70–99)
GLUCOSE UR QL: NEGATIVE MG/DL — SIGNIFICANT CHANGE UP
HCT VFR BLD CALC: 49.5 % — HIGH (ref 34.5–45)
HGB BLD-MCNC: 16.1 G/DL — HIGH (ref 11.5–15.5)
IMM GRANULOCYTES NFR BLD AUTO: 0.2 % — SIGNIFICANT CHANGE UP (ref 0–1.5)
KETONES UR-MCNC: NEGATIVE — SIGNIFICANT CHANGE UP
LEUKOCYTE ESTERASE UR-ACNC: NEGATIVE — SIGNIFICANT CHANGE UP
LYMPHOCYTES # BLD AUTO: 39.9 % — SIGNIFICANT CHANGE UP (ref 13–44)
LYMPHOCYTES # BLD AUTO: 4.33 K/UL — HIGH (ref 1–3.3)
MCHC RBC-ENTMCNC: 29.5 PG — SIGNIFICANT CHANGE UP (ref 27–34)
MCHC RBC-ENTMCNC: 32.5 GM/DL — SIGNIFICANT CHANGE UP (ref 32–36)
MCV RBC AUTO: 90.8 FL — SIGNIFICANT CHANGE UP (ref 80–100)
MONOCYTES # BLD AUTO: 0.99 K/UL — HIGH (ref 0–0.9)
MONOCYTES NFR BLD AUTO: 9.1 % — SIGNIFICANT CHANGE UP (ref 2–14)
NEUTROPHILS # BLD AUTO: 4.66 K/UL — SIGNIFICANT CHANGE UP (ref 1.8–7.4)
NEUTROPHILS NFR BLD AUTO: 42.9 % — LOW (ref 43–77)
NITRITE UR-MCNC: NEGATIVE — SIGNIFICANT CHANGE UP
PH UR: 6.5 — SIGNIFICANT CHANGE UP (ref 5–8)
PLATELET # BLD AUTO: 541 K/UL — HIGH (ref 150–400)
POTASSIUM SERPL-MCNC: 3.7 MMOL/L — SIGNIFICANT CHANGE UP (ref 3.5–5.3)
POTASSIUM SERPL-SCNC: 3.7 MMOL/L — SIGNIFICANT CHANGE UP (ref 3.5–5.3)
PROT UR-MCNC: NEGATIVE MG/DL — SIGNIFICANT CHANGE UP
RBC # BLD: 5.45 M/UL — HIGH (ref 3.8–5.2)
RBC # FLD: 14.4 % — SIGNIFICANT CHANGE UP (ref 10.3–14.5)
SODIUM SERPL-SCNC: 133 MMOL/L — LOW (ref 135–145)
SP GR SPEC: 1 — LOW (ref 1.01–1.02)
UROBILINOGEN FLD QL: NEGATIVE MG/DL — SIGNIFICANT CHANGE UP
WBC # BLD: 10.86 K/UL — HIGH (ref 3.8–10.5)
WBC # FLD AUTO: 10.86 K/UL — HIGH (ref 3.8–10.5)

## 2020-06-08 PROCEDURE — G0463: CPT

## 2020-06-08 RX ORDER — SODIUM CHLORIDE 9 MG/ML
3 INJECTION INTRAMUSCULAR; INTRAVENOUS; SUBCUTANEOUS EVERY 8 HOURS
Refills: 0 | Status: DISCONTINUED | OUTPATIENT
Start: 2020-06-16 | End: 2020-06-17

## 2020-06-08 RX ORDER — CEFAZOLIN SODIUM 1 G
2000 VIAL (EA) INJECTION ONCE
Refills: 0 | Status: COMPLETED | OUTPATIENT
Start: 2020-06-16 | End: 2020-06-16

## 2020-06-08 NOTE — H&P PST ADULT - ASSESSMENT
80 Y/O post menopausal female (Vaginal delivery) with HTN, Hypothyroidism, stress incontinence, cystocele, midline and incomplete uterovaginal prolapse. Pt report progression of urinary incontinence, vaginal pressure and discomfort with urination. Seen today for scheduled surgery with Dr. Tuttle. Surgery protocol reviewed with patient today. Pt to follow-up with PCP and cardiologist for clearances  CAPRINI VTE 2.0 SCORE [CLOT updated 2019]    AGE RELATED RISK FACTORS                                                       MOBILITY RELATED FACTORS  [ ] Age 41-60 years                                            (1 Point)                    [ ] Bed rest                                                        (1 Point)  [ ] Age: 61-74 years                                           (2 Points)                  [ ] Plaster cast                                                   (2 Points)  [x ] Age= 75 years                                              (3 Points)                    [ ] Bed bound for more than 72 hours                 (2 Points)    DISEASE RELATED RISK FACTORS                                               GENDER SPECIFIC FACTORS  [ ] Edema in the lower extremities                       (1 Point)              [ ] Pregnancy                                                     (1 Point)  [x ] Varicose veins                                               (1 Point)                     [ ] Post-partum < 6 weeks                                   (1 Point)             [ ] BMI > 25 Kg/m2                                            (1 Point)                     [ ] Hormonal therapy  or oral contraception          (1 Point)                 [ ] Sepsis (in the previous month)                        (1 Point)               [ ] History of pregnancy complications                 (1 point)  [ ] Pneumonia or serious lung disease                                               [ ] Unexplained or recurrent                     (1 Point)           (in the previous month)                               (1 Point)  [ ] Abnormal pulmonary function test                     (1 Point)                 SURGERY RELATED RISK FACTORS  [ ] Acute myocardial infarction                              (1 Point)               [ ]  Section                                             (1 Point)  [ ] Congestive heart failure (in the previous month)  (1 Point)      [ ] Minor surgery                                                  (1 Point)   [ ] Inflammatory bowel disease                             (1 Point)               [ ] Arthroscopic surgery                                        (2 Points)  [ ] Central venous access                                      (2 Points)                [x ] General surgery lasting more than 45 minutes (2 points)  [ ] Malignancy- Present or previous                   (2 Points)                [ ] Elective arthroplasty                                         (5 points)    [ ] Stroke (in the previous month)                          (5 Points)                                                                                                                                                           HEMATOLOGY RELATED FACTORS                                                 TRAUMA RELATED RISK FACTORS  [ ] Prior episodes of VTE                                     (3 Points)                [ ] Fracture of the hip, pelvis, or leg                       (5 Points)  [ ] Positive family history for VTE                         (3 Points)             [ ] Acute spinal cord injury (in the previous month)  (5 Points)  [ ] Prothrombin 26069 A                                     (3 Points)               [ ] Paralysis  (less than 1 month)                             (5 Points)  [ ] Factor V Leiden                                             (3 Points)                  [ ] Multiple Trauma within 1 month                        (5 Points)  [ ] Lupus anticoagulants                                     (3 Points)                                                           [ ] Anticardiolipin antibodies                               (3 Points)                                                       [ ] High homocysteine in the blood                      (3 Points)                                             [ ] Other congenital or acquired thrombophilia      (3 Points)                                                [ ] Heparin induced thrombocytopenia                  (3 Points)                                     Total Score [      6    ]  OPIOID RISK TOOL    RAMAN EACH BOX THAT APPLIES AND ADD TOTALS AT THE END    FAMILY HISTORY OF SUBSTANCE ABUSE                 FEMALE         MALE                                                Alcohol                             [  ]1 pt          [  ]3pts                                               Illegal Durgs                     [  ]2 pts        [  ]3pts                                               Rx Drugs                           [  ]4 pts        [  ]4 pts    PERSONAL HISTORY OF SUBSTANCE ABUSE                                                                                          Alcohol                             [  ]3 pts       [  ]3 pts                                               Illegal Drugs                     [  ]4 pts        [  ]4 pts                                               Rx Drugs                           [  ]5 pts        [  ]5 pts    AGE BETWEEN 16-45 YEARS                                      [  ]1 pt         [  ]1 pt    HISTORY OF PREADOLESCENT   SEXUAL ABUSE                                                             [  ]3 pts        [  ]0pts    PSYCHOLOGICAL DISEASE                     ADD, OCD, Bipolar, Schizophrenia        [  ]2 pts         [  ]2 pts                      Depression                                               [  ]1 pt           [  ]1 pt           SCORING TOTAL   (add numbers and type here)              (**0*)                                     A score of 3 or lower indicated LOW risk for future opioid abuse  A score of 4 to 7 indicated moderate risk for future opioid abuse  A score of 8 or higher indicates a high risk for opioid abuse

## 2020-06-08 NOTE — H&P PST ADULT - HISTORY OF PRESENT ILLNESS
80 Y/O post menopausal female (Vaginal delivery) with HTN, Hypothyroidism, stress incontinence, cystocele, midline and incomplete uterovaginal prolapse. Pt report progression of urinary incontinence, vaginal pressure and discomfort with urination. Seen today for scheduled surgery with Dr. Tuttle.

## 2020-06-08 NOTE — H&P PST ADULT - NSICDXPASTMEDICALHX_GEN_ALL_CORE_FT
PAST MEDICAL HISTORY:  Adrenal insufficiency     Cystocele, midline     Hypertension     Hypothyroid     Stress incontinence, female     Vaginal delivery PAST MEDICAL HISTORY:  Adrenal insufficiency     Cystocele, midline     Hypertension     Hypothyroid     Incomplete uterovaginal prolapse     Stress incontinence, female     Vaginal delivery PAST MEDICAL HISTORY:  Adrenal insufficiency     Cystocele, midline     Hypertension     Hypothyroid     Incomplete uterovaginal prolapse     Seasonal allergies     Stress incontinence, female     Vaginal delivery

## 2020-06-08 NOTE — H&P PST ADULT - EKG AND INTERPRETATION
EKG received from cardiologist office today dated 6/3/2020  Normal sinus rhythm 75 bpm, no acute change.

## 2020-06-08 NOTE — H&P PST ADULT - NSICDXPROBLEM_GEN_ALL_CORE_FT
PROBLEM DIAGNOSES  Problem: Hypertension  Assessment and Plan: F/U with cardiologist for clearance     Problem: Incomplete uterine prolapse  Assessment and Plan: Vaginal hysterectomy only, colpectomy, sub urethral sling cystoscopy f/u with PCP for medical clearance     Problem: Need for prophylactic measure  Assessment and Plan: high risk surgical team to determine prophylactic intervention

## 2020-06-08 NOTE — H&P PST ADULT - NSANTHOSAYNRD_GEN_A_CORE
No. KAHLIL screening performed.  STOP BANG Legend: 0-2 = LOW Risk; 3-4 = INTERMEDIATE Risk; 5-8 = HIGH Risk

## 2020-06-09 PROBLEM — N81.2 INCOMPLETE UTEROVAGINAL PROLAPSE: Chronic | Status: ACTIVE | Noted: 2020-06-08

## 2020-06-09 PROBLEM — N39.3 STRESS INCONTINENCE (FEMALE) (MALE): Chronic | Status: ACTIVE | Noted: 2020-06-08

## 2020-06-09 PROBLEM — N81.11 CYSTOCELE, MIDLINE: Chronic | Status: ACTIVE | Noted: 2020-06-08

## 2020-06-09 LAB
CULTURE RESULTS: SIGNIFICANT CHANGE UP
SPECIMEN SOURCE: SIGNIFICANT CHANGE UP

## 2020-06-11 DIAGNOSIS — Z01.818 ENCOUNTER FOR OTHER PREPROCEDURAL EXAMINATION: ICD-10-CM

## 2020-06-11 PROBLEM — J30.2 OTHER SEASONAL ALLERGIC RHINITIS: Chronic | Status: ACTIVE | Noted: 2020-06-08

## 2020-06-12 ENCOUNTER — APPOINTMENT (OUTPATIENT)
Dept: ENDOCRINOLOGY | Facility: CLINIC | Age: 80
End: 2020-06-12
Payer: MEDICARE

## 2020-06-12 PROCEDURE — 36415 COLL VENOUS BLD VENIPUNCTURE: CPT

## 2020-06-13 ENCOUNTER — APPOINTMENT (OUTPATIENT)
Dept: DISASTER EMERGENCY | Facility: CLINIC | Age: 80
End: 2020-06-13

## 2020-06-15 ENCOUNTER — APPOINTMENT (OUTPATIENT)
Dept: UROGYNECOLOGY | Facility: CLINIC | Age: 80
End: 2020-06-15

## 2020-06-15 LAB — SARS-COV-2 N GENE NPH QL NAA+PROBE: NOT DETECTED

## 2020-06-15 PROCEDURE — G9001: CPT

## 2020-06-16 ENCOUNTER — INPATIENT (INPATIENT)
Facility: HOSPITAL | Age: 80
LOS: 0 days | Discharge: HOME | DRG: 742 | End: 2020-06-17
Attending: OBSTETRICS & GYNECOLOGY | Admitting: OBSTETRICS & GYNECOLOGY
Payer: MEDICARE

## 2020-06-16 ENCOUNTER — TRANSCRIPTION ENCOUNTER (OUTPATIENT)
Age: 80
End: 2020-06-16

## 2020-06-16 ENCOUNTER — APPOINTMENT (OUTPATIENT)
Dept: UROGYNECOLOGY | Facility: HOSPITAL | Age: 80
End: 2020-06-16
Payer: MEDICARE

## 2020-06-16 ENCOUNTER — RESULT REVIEW (OUTPATIENT)
Age: 80
End: 2020-06-16

## 2020-06-16 VITALS
WEIGHT: 138.23 LBS | SYSTOLIC BLOOD PRESSURE: 124 MMHG | HEIGHT: 63 IN | OXYGEN SATURATION: 97 % | RESPIRATION RATE: 16 BRPM | TEMPERATURE: 98 F | DIASTOLIC BLOOD PRESSURE: 58 MMHG | HEART RATE: 77 BPM

## 2020-06-16 DIAGNOSIS — N81.2 INCOMPLETE UTEROVAGINAL PROLAPSE: ICD-10-CM

## 2020-06-16 LAB
25(OH)D3 SERPL-MCNC: 41.5 NG/ML
ABO RH CONFIRMATION: SIGNIFICANT CHANGE UP
ALBUMIN SERPL ELPH-MCNC: 4.7 G/DL
ALP BLD-CCNC: 78 U/L
ALT SERPL-CCNC: 20 U/L
ANION GAP SERPL CALC-SCNC: 17 MMOL/L
AST SERPL-CCNC: 31 U/L
BASOPHILS # BLD AUTO: 0.1 K/UL
BASOPHILS NFR BLD AUTO: 1.1 %
BILIRUB SERPL-MCNC: 0.6 MG/DL
BUN SERPL-MCNC: 26 MG/DL
CALCIUM SERPL-MCNC: 10.2 MG/DL
CHLORIDE SERPL-SCNC: 97 MMOL/L
CHOLEST SERPL-MCNC: 307 MG/DL
CHOLEST/HDLC SERPL: 6 RATIO
CO2 SERPL-SCNC: 24 MMOL/L
CREAT SERPL-MCNC: 1.12 MG/DL
EOSINOPHIL # BLD AUTO: 0.57 K/UL
EOSINOPHIL NFR BLD AUTO: 6.1 %
ESTIMATED AVERAGE GLUCOSE: 126 MG/DL
GLUCOSE SERPL-MCNC: 85 MG/DL
HBA1C MFR BLD HPLC: 6 %
HCT VFR BLD CALC: 37.7 % — SIGNIFICANT CHANGE UP (ref 34.5–45)
HCT VFR BLD CALC: 55.2 %
HDLC SERPL-MCNC: 51 MG/DL
HGB BLD-MCNC: 12.4 G/DL — SIGNIFICANT CHANGE UP (ref 11.5–15.5)
HGB BLD-MCNC: 16.4 G/DL
IMM GRANULOCYTES NFR BLD AUTO: 0.2 %
LDLC SERPL CALC-MCNC: 220 MG/DL
LYMPHOCYTES # BLD AUTO: 3.86 K/UL
LYMPHOCYTES NFR BLD AUTO: 41 %
MAN DIFF?: NORMAL
MCHC RBC-ENTMCNC: 28.9 PG
MCHC RBC-ENTMCNC: 29.7 GM/DL
MCHC RBC-ENTMCNC: 30 PG — SIGNIFICANT CHANGE UP (ref 27–34)
MCHC RBC-ENTMCNC: 32.9 GM/DL — SIGNIFICANT CHANGE UP (ref 32–36)
MCV RBC AUTO: 91.1 FL — SIGNIFICANT CHANGE UP (ref 80–100)
MCV RBC AUTO: 97.2 FL
MONOCYTES # BLD AUTO: 0.79 K/UL
MONOCYTES NFR BLD AUTO: 8.4 %
NEUTROPHILS # BLD AUTO: 4.08 K/UL
NEUTROPHILS NFR BLD AUTO: 43.2 %
PLATELET # BLD AUTO: 397 K/UL — SIGNIFICANT CHANGE UP (ref 150–400)
PLATELET # BLD AUTO: 531 K/UL
POTASSIUM SERPL-SCNC: 4.8 MMOL/L
PROT SERPL-MCNC: 8.1 G/DL
RBC # BLD: 4.14 M/UL — SIGNIFICANT CHANGE UP (ref 3.8–5.2)
RBC # BLD: 5.68 M/UL
RBC # FLD: 14.3 % — SIGNIFICANT CHANGE UP (ref 10.3–14.5)
RBC # FLD: 15.3 %
SODIUM SERPL-SCNC: 138 MMOL/L
T3FREE SERPL-MCNC: 1.96 PG/ML
T4 FREE SERPL-MCNC: 1.1 NG/DL
TRIGL SERPL-MCNC: 177 MG/DL
TSH SERPL-ACNC: 30.1 UIU/ML
VIT B12 SERPL-MCNC: 745 PG/ML
WBC # BLD: 20.3 K/UL — HIGH (ref 3.8–10.5)
WBC # FLD AUTO: 20.3 K/UL — HIGH (ref 3.8–10.5)
WBC # FLD AUTO: 9.42 K/UL

## 2020-06-16 PROCEDURE — 57110 VAGNC COMPL RMVL VAG WALL: CPT

## 2020-06-16 PROCEDURE — 57288 REPAIR BLADDER DEFECT: CPT

## 2020-06-16 PROCEDURE — 58260 VAGINAL HYSTERECTOMY: CPT

## 2020-06-16 PROCEDURE — 93010 ELECTROCARDIOGRAM REPORT: CPT

## 2020-06-16 PROCEDURE — 99222 1ST HOSP IP/OBS MODERATE 55: CPT

## 2020-06-16 PROCEDURE — 88302 TISSUE EXAM BY PATHOLOGIST: CPT | Mod: 26

## 2020-06-16 PROCEDURE — 88305 TISSUE EXAM BY PATHOLOGIST: CPT | Mod: 26

## 2020-06-16 RX ORDER — METOPROLOL TARTRATE 50 MG
50 TABLET ORAL DAILY
Refills: 0 | Status: DISCONTINUED | OUTPATIENT
Start: 2020-06-16 | End: 2020-06-17

## 2020-06-16 RX ORDER — LEVOTHYROXINE SODIUM 125 MCG
88 TABLET ORAL DAILY
Refills: 0 | Status: DISCONTINUED | OUTPATIENT
Start: 2020-06-16 | End: 2020-06-17

## 2020-06-16 RX ORDER — METOPROLOL TARTRATE 50 MG
1 TABLET ORAL
Qty: 0 | Refills: 0 | DISCHARGE

## 2020-06-16 RX ORDER — FENTANYL CITRATE 50 UG/ML
25 INJECTION INTRAVENOUS
Refills: 0 | Status: DISCONTINUED | OUTPATIENT
Start: 2020-06-16 | End: 2020-06-16

## 2020-06-16 RX ORDER — KETOROLAC TROMETHAMINE 30 MG/ML
30 SYRINGE (ML) INJECTION EVERY 8 HOURS
Refills: 0 | Status: DISCONTINUED | OUTPATIENT
Start: 2020-06-16 | End: 2020-06-16

## 2020-06-16 RX ORDER — METOPROLOL TARTRATE 50 MG
25 TABLET ORAL DAILY
Refills: 0 | Status: DISCONTINUED | OUTPATIENT
Start: 2020-06-16 | End: 2020-06-16

## 2020-06-16 RX ORDER — OXYCODONE AND ACETAMINOPHEN 5; 325 MG/1; MG/1
1 TABLET ORAL EVERY 4 HOURS
Refills: 0 | Status: DISCONTINUED | OUTPATIENT
Start: 2020-06-16 | End: 2020-06-17

## 2020-06-16 RX ORDER — CEFAZOLIN SODIUM 1 G
1000 VIAL (EA) INJECTION EVERY 8 HOURS
Refills: 0 | Status: COMPLETED | OUTPATIENT
Start: 2020-06-16 | End: 2020-06-17

## 2020-06-16 RX ORDER — ONDANSETRON 8 MG/1
4 TABLET, FILM COATED ORAL ONCE
Refills: 0 | Status: DISCONTINUED | OUTPATIENT
Start: 2020-06-16 | End: 2020-06-16

## 2020-06-16 RX ORDER — METOPROLOL TARTRATE 50 MG
25 TABLET ORAL
Refills: 0 | Status: COMPLETED | OUTPATIENT
Start: 2020-06-16 | End: 2020-06-16

## 2020-06-16 RX ORDER — HYDROCORTISONE 20 MG
50 TABLET ORAL ONCE
Refills: 0 | Status: COMPLETED | OUTPATIENT
Start: 2020-06-16 | End: 2020-06-16

## 2020-06-16 RX ORDER — SODIUM CHLORIDE 9 MG/ML
1000 INJECTION, SOLUTION INTRAVENOUS
Refills: 0 | Status: DISCONTINUED | OUTPATIENT
Start: 2020-06-16 | End: 2020-06-17

## 2020-06-16 RX ORDER — SODIUM CHLORIDE 9 MG/ML
1000 INJECTION, SOLUTION INTRAVENOUS
Refills: 0 | Status: DISCONTINUED | OUTPATIENT
Start: 2020-06-16 | End: 2020-06-16

## 2020-06-16 RX ORDER — HYDROCORTISONE 20 MG
25 TABLET ORAL EVERY 8 HOURS
Refills: 0 | Status: COMPLETED | OUTPATIENT
Start: 2020-06-16 | End: 2020-06-17

## 2020-06-16 RX ORDER — POLYETHYLENE GLYCOL 3350 17 G/17G
17 POWDER, FOR SOLUTION ORAL AT BEDTIME
Refills: 0 | Status: DISCONTINUED | OUTPATIENT
Start: 2020-06-16 | End: 2020-06-17

## 2020-06-16 RX ORDER — OXYCODONE AND ACETAMINOPHEN 5; 325 MG/1; MG/1
2 TABLET ORAL EVERY 6 HOURS
Refills: 0 | Status: DISCONTINUED | OUTPATIENT
Start: 2020-06-16 | End: 2020-06-17

## 2020-06-16 RX ORDER — METOPROLOL TARTRATE 50 MG
2.5 TABLET ORAL ONCE
Refills: 0 | Status: COMPLETED | OUTPATIENT
Start: 2020-06-16 | End: 2020-06-16

## 2020-06-16 RX ORDER — ONDANSETRON 8 MG/1
4 TABLET, FILM COATED ORAL EVERY 6 HOURS
Refills: 0 | Status: DISCONTINUED | OUTPATIENT
Start: 2020-06-16 | End: 2020-06-17

## 2020-06-16 RX ORDER — ACETAMINOPHEN 500 MG
1000 TABLET ORAL ONCE
Refills: 0 | Status: COMPLETED | OUTPATIENT
Start: 2020-06-16 | End: 2020-06-16

## 2020-06-16 RX ADMIN — FENTANYL CITRATE 25 MICROGRAM(S): 50 INJECTION INTRAVENOUS at 14:00

## 2020-06-16 RX ADMIN — Medication 400 MILLIGRAM(S): at 14:45

## 2020-06-16 RX ADMIN — Medication 100 MILLIGRAM(S): at 11:15

## 2020-06-16 RX ADMIN — Medication 25 MILLIGRAM(S): at 23:12

## 2020-06-16 RX ADMIN — Medication 25 MILLIGRAM(S): at 17:51

## 2020-06-16 RX ADMIN — Medication 30 MILLIGRAM(S): at 23:12

## 2020-06-16 RX ADMIN — Medication 30 MILLIGRAM(S): at 15:00

## 2020-06-16 RX ADMIN — Medication 50 MILLIGRAM(S): at 11:07

## 2020-06-16 RX ADMIN — FENTANYL CITRATE 25 MICROGRAM(S): 50 INJECTION INTRAVENOUS at 14:15

## 2020-06-16 RX ADMIN — SODIUM CHLORIDE 100 MILLILITER(S): 9 INJECTION, SOLUTION INTRAVENOUS at 21:37

## 2020-06-16 RX ADMIN — Medication 2.5 MILLIGRAM(S): at 16:22

## 2020-06-16 RX ADMIN — Medication 100 MILLIGRAM(S): at 20:15

## 2020-06-16 RX ADMIN — Medication 25 MILLIGRAM(S): at 19:43

## 2020-06-16 NOTE — BRIEF OPERATIVE NOTE - OPERATION/FINDINGS
Stage 4 prolapse. Small, mobile uterus.   On cystoscopy, normal bladder mucosa. No suture, mesh, injury, or foreign body. Bilateral ureteral orifices identified and effluxing clear yellow urine.   On rectal exam, no suture or injury.

## 2020-06-16 NOTE — CONSULT NOTE ADULT - SUBJECTIVE AND OBJECTIVE BOX
[facility]                                                        Office: [address] [place] [address]                                                       Telephone: [phone]. Fax:[phone]                                                              CARDIOLOGY CONSULTATION NOTE                                                                                             Consult requested by:   Dr James Yadav    PCP    Primary Cardiologist.- [facility]    Reason for Consultation:  PAF    History obtained by: Patient and medical record     obtained: No    Chief complaint:    Patient is a 79y old  Female who presents with a chief complaint of " I am here for pre-testing for hysterectomy because of uterus is falling out" (2020 13:39)      HPI:  78 Y/O post menopausal female (Vaginal delivery) with HTN, Hypothyroidism, stress incontinence, cystocele, midline and incomplete uterovaginal prolapse. Pt report progression of urinary incontinence, vaginal pressure and discomfort with urination. Seen today for scheduled surgery with Dr. Tuttle. (2020 13:39)    Above HPI note and reviewed.  78 Y/O post menopausal female  with HTN, Hypothyroidism, stress incontinence, cystocele, midline and incomplete uterovaginal prolapse. Pt report progression of urinary incontinence, vaginal pressure and discomfort with urination. Patient had surgery today. Post op patient went into Atrial fibrillation with RVR. Patient was given IV Lopressor 2.5 mg. Patients rate came down, then she spontaneously converted to NSR. I was called to see the patient for PAF. Patient did not have any chest pain,dizziness or any symptoms with PAF with RVR. Patient reports that she has history of PAF, she is on Toprol XL 50 mgdaily, She did not take Toprol on 6/15 and . Patient is currently in NSR. Patient has no complaints. Patient has a known LBBB.    ALLERGIES: Allergies    No Known Allergies    Intolerances          CURRENT MEDICATIONS:  MEDICATIONS  (STANDING):  hydrocortisone sodium succinate Injectable 25 milliGRAM(s) IV Push every 8 hours  lactated ringers. 1000 milliLiter(s) (75 mL/Hr) IV Continuous <Continuous>  metoprolol tartrate 25 milliGRAM(s) Oral <User Schedule>      HOME MEDICATIONS:  Home Medications:  Ativan 1 mg oral tablet: 1 tab(s) orally once a day, As Needed (2020 09:31)  hydrocortisone 10 mg oral tablet: 1 tab(s) orally in the morning; HALF tab in the evening (2020 09:31)  Metoprolol Succinate ER 50 mg oral tablet, extended release: 1 tab(s) orally once a day (2020 09:31)  Synthroid 88 mcg (0.088 mg) oral tablet: 1 tab(s) orally once a day (2020 09:31)    PAST MEDICAL HISTORY  Seasonal allergies  Incomplete uterovaginal prolapse  Stress incontinence, female  Cystocele, midline  Vaginal delivery  Adrenal insufficiency  Hypertension  Hypothyroid      PAST SURGICAL HISTORY  No significant past surgical history      FAMILY HISTORY:  FHx: lung cancer      SOCIAL HISTORY:       CIGARETTES:   NO    ALCOHOL: No    DRUG ABUSE: No      REVIEW OF SYSTEMS:  CONSTITUTIONAL:  as per HPI  HEENT:  Eyes:  No diplopia or blurred vision. ENT:  No earache, sore throat or runny nose.  CARDIOVASCULAR:  No pressure, squeezing, strangling, tightness, heaviness or aching about the chest, neck, axilla or epigastrium.  RESPIRATORY:  No cough, shortness of breath, PND or orthopnea.  GASTROINTESTINAL:  No nausea, vomiting or diarrhea.  GENITOURINARY:  No dysuria, frequency or urgency. No Blood in urine  MUSCULOSKELETAL:  no joint aches, no muscle pain  SKIN:  No change in skin, hair or nails.  NEUROLOGIC:  No paresthesias, fasciculations, seizures or weakness.  PSYCHIATRIC:  No disorder of thought or mood.  ENDOCRINE:  No heat or cold intolerance, polyuria or polydipsia.  HEMATOLOGICAL:  No easy bruising or bleeding.           	        Vital Signs Last 24 Hrs  T(C): 36.8 (20 @ 17:30), Max: 36.8 (20 @ 09:28)  T(F): 98.2 (20 @ 17:30), Max: 98.2 (20 @ 09:28)  HR: 71 (20 @ 17:45) (58 - 155)  BP: 134/77 (20 @ 17:45) (80/44 - 134/77)  BP(mean): --  RR: 20 (20 @ 17:45) (12 - 23)  SpO2: 100% (20 @ 17:45) (95% - 100%)    PHYSICAL EXAM:  Constitutional: Comfortable . No acute distress.   HEENT: Atraumatic and normcephalic , neck is supple . no JVD. Unremarkable  CNS: Alert and awake, Grossly nonfocal.  Lymph Nodes: Cervical : Not palpable.  Respiratory: Bilateral clear breath sounds.  Cardiovascular: Normal S1S2. . No murmur/rubs or gallop.  Gastrointestinal: Soft non-tender and non distended . +Bowel sounds.   Extremities: No leg edema.   Psychiatric: Calm . no agitation.  Skin: No skin rash.    Intake and output:   -16 @ 07:01  -  -16 @ 17:56  --------------------------------------------------------  IN: 225 mL / OUT: 0 mL / NET: 225 mL        LABS:                        12.4   20.30 )-----------( 397      ( 2020 15:22 )             37.7             ;p-BNP=        INTERPRETATION OF TELEMETRY: Reviewed by me.   ECG: Reviewed by me.  EKG#1- Atrial fibrillation with RVR.  EKG#2- NSR, LBBB.    RADIOLOGY & ADDITIONAL STUDIES/ECHO/CARDIAC CATH:                     Recent ECHO- LVEF 55-60%, Moderate MR.  Nuclear STress test 2020- No evidence of ischemia.

## 2020-06-16 NOTE — DISCHARGE NOTE PROVIDER - NSDCCPTREATMENT_GEN_ALL_CORE_FT
PRINCIPAL PROCEDURE  Procedure: Vaginal hysterectomy with total or partial colpectomy  Findings and Treatment:

## 2020-06-16 NOTE — DISCHARGE NOTE PROVIDER - HOSPITAL COURSE
79y s/p total vaginal hysterectomy, colpectomy, perineorrhaphy suburethral sling, and cystoscopy due to stage 4 prolapse. After uneventful extubation, patient was transferred to PACU. She developed AFib in PACU and was successfully cardioverted to NSR. She was transferred to the floor. Her pain was well controlled. She is tolerating a regular diet. She is ambulating independently. Patient with flatus. Labs and Vitals WNL upon discharge.

## 2020-06-16 NOTE — DISCHARGE NOTE PROVIDER - NSDCFUSCHEDAPPT_GEN_ALL_CORE_FT
[facility] ; 06/29/2020 ; P [facility]rogyn [address] ; 07/27/2020 ; [facility] [address] ; 08/04/2020 ; [facility] [address] [facility]

## 2020-06-16 NOTE — BRIEF OPERATIVE NOTE - NSICDXBRIEFPOSTOP_GEN_ALL_CORE_FT
POST-OP DIAGNOSIS:  Rectocele 16-Jun-2020 13:43:40  Tata Guerrero  Complete uterovaginal prolapse 16-Jun-2020 13:43:32  Tata Guerrero  Urinary, incontinence, stress female 16-Jun-2020 13:43:25  Tata Guerrero

## 2020-06-16 NOTE — BRIEF OPERATIVE NOTE - NSICDXBRIEFPROCEDURE_GEN_ALL_CORE_FT
PROCEDURES:  Repair, rectocele, with perineorrhaphy 16-Jun-2020 13:43:12  Tata Guerrero  Cystoscopy, rigid 16-Jun-2020 13:42:00  Tata Guerrero  Creation, midurethral sling, female 16-Jun-2020 13:41:51  Tata Guerrero  Vaginal hysterectomy with total or partial colpectomy 16-Jun-2020 13:41:32  Tata Guerrero

## 2020-06-16 NOTE — CONSULT NOTE ADULT - ASSESSMENT
80 Y/O post menopausal female  with HTN, Hypothyroidism, stress incontinence, cystocele, midline and incomplete uterovaginal prolapse. Pt report progression of urinary incontinence, vaginal pressure and discomfort with urination. Patient had surgery today. Post op patient went into Atrial fibrillation with RVR. Patient was given IV Lopressor 2.5 mg. Patients rate came down, then she spontaneously converted to NSR. I was called to see the patient for PAF. Patient did not have any chest pain,dizziness or any symptoms with PAF with RVR. Patient reports that she has history of PAF, she is on Toprol XL 50 mgdaily, She did not take Toprol on 6/15 and 6/16. Patient is currently in NSR. Patient has no complaints. Patient has a known LBBB.    Assessment:  1. PAF- Now in NSR, Patient did not take Toprol XL for 2 days. Asymptomatic. Patient was not on Anticoagulation at home.    Recommendations:  1. Lopressor 25 mg X1 now, another dose at 11 pm today , hold for SBP less than 90.  2. Resume her normal home Toprol XL 50 mg daily from 6/17/2020.  3. Patient can be discharged home with instructions to f/u with her own Cardiologist in 1 week. Patient should be educated about the importance of compliance.    Discussed with OBGYN resident.    Will sign off.    If you have any questions, Please feel free to call me.

## 2020-06-16 NOTE — CHART NOTE - NSCHARTNOTEFT_GEN_A_CORE
S: Patient seen and examined at bedside.  Pain is controlled on current pain regimen.  Tolerating liquids, denies N/V.  Not yet passing flatus.    O:  T(C): 36.4 (06-16-20 @ 20:00), Max: 36.8 (06-16-20 @ 09:28)  HR: 68 (06-16-20 @ 20:00) (58 - 155)  BP: 143/68 (06-16-20 @ 20:00) (80/44 - 143/68)  RR: 19 (06-16-20 @ 20:00) (12 - 23)  SpO2: 100% (06-16-20 @ 20:00) (95% - 100%)    06-16-20 @ 07:01  -  06-16-20 @ 20:21  --------------------------------------------------------  IN: 450 mL / OUT: 600 mL / NET: -150 mL    PE:  General: NAD  Heart: RRR  Lungs: CTABL  Abdomen: soft, non-tender, +BS  Pelvic: no vaginal bleeding, chester in place  Ext: no calf pain    A/P: 78yo s/p total vaginal hysterectomy, colpectomy, perineorrhaphy suburethral sling, and cystoscopy due to stage 4 prolapse, POD #0, HD #1. Complicated by afib in PACU (patient did not take metoprolol at home for past 2 days). Patient seen by cardiology and metoprolol given, will repeat dose at 2300 and continue home dose tomorrow. Currently in NSR on the cardiac monitor.   -Continue post operative care  -Continue current pain medication  -Continue home meds  -Regular diet  -Encourage incentive spirometry and ambulation  -Will d/c chester in AM with TOV  -Metoprolol @ 2300, if afib repeats will re-consult cardiology     D/W Dr. Tuttle

## 2020-06-16 NOTE — DISCHARGE NOTE PROVIDER - NSDCFUADDINST_GEN_ALL_CORE_FT
Please see handout provided to patient and family. Please follow up with Dr. Tuttle in 1 week if there is no catheter in place. If you are discharged with a catheter, call the office upon discharge to make an appointment for 2-3 days from now.  No lifting over 10lbs. Nothing per vagina for 6 weeks. No exercise. Showering is allowed but do not use a tub.   Colace and/or Miralax will be needed to soften the stool. Do not strain when going to the bathroom.

## 2020-06-16 NOTE — DISCHARGE NOTE PROVIDER - NSDCMRMEDTOKEN_GEN_ALL_CORE_FT
Ativan 1 mg oral tablet: 1 tab(s) orally once a day, As Needed  hydrocortisone 10 mg oral tablet: 1 tab(s) orally in the morning; HALF tab in the evening  Metoprolol Succinate ER 50 mg oral tablet, extended release: 1 tab(s) orally once a day  Synthroid 88 mcg (0.088 mg) oral tablet: 1 tab(s) orally once a day

## 2020-06-16 NOTE — PROGRESS NOTE ADULT - ASSESSMENT
79y s/p total vaginal hysterectomy, colpectomy, perineorrhaphy suburethral sling, and cystoscopy due to stage 4 prolapse, POD #0, HD #1. VSS. 79y s/p total vaginal hysterectomy, colpectomy, perineorrhaphy suburethral sling, and cystoscopy due to stage 4 prolapse, POD #0, HD #1.   Shortly after patient was seen and examined at bedside, patient went into AFib with HR in the 130s-160s, confirmed by EKG. No headache, dizziness, chest pain, palpitations or SOB. Anesthesia was notified and Dr. Yadav pushed Metoprolol 25mg IV with return to NSR with HR in 60s. An immediate cardiology consult was placed - advised to give Metoprolol 25mg PO.     Cardiac: history of LBBB, nonischemic cardiomyopathy, and non-obstructive coronary artery disease, well controlled on Metoprolol 50mg ER qd. Developed AFib in PACU which responded well to Metoprolol 25mg IV. Cardiology consulted - recommended Metoprolol 25mg PO as well. Formal consult pending. STAT H&H sent, reviewed and stable.   Pulmonary: no active disease, incentive spirometer at bedside.   Neuro: pain well controlled with current regimen.   Endo: Chronic adrenal insufficiency on Hydrocortisone 10mg PO in AM, 5mg in PM. Will give Hydrocortisone 25mg IV q8h as per outpatient Endocrinologist's recommendation. History of hypothyroidism on Synthroid 88mcg qd. Will continue.   GI: tolerating regular diet   : Mascorro catheter in place. Active TOV in AM.   Heme: SCDs when not ambulating. CBC wnl will continue with AM labs.   FEN: IVF at 75cc/hr, will discontinue once PO intake is adequate. Electrolytes wnl. Will continue AM labs.   Dispo: Admission to floor due to complexity of case and development of AFib in PACU. Will f/u formal Cardiology consult.     d/w Dr. Tuttle 79y s/p total vaginal hysterectomy, colpectomy, perineorrhaphy suburethral sling, and cystoscopy due to stage 4 prolapse, POD #0, HD #1.   Shortly after patient was seen and examined at bedside, patient went into AFib with HR in the 130s-160s, confirmed by EKG. No headache, dizziness, chest pain, palpitations or SOB. Anesthesia was notified and Dr. Yadav pushed Metoprolol 2.5mg IV with return to NSR with HR in 60s. An immediate cardiology consult was placed - advised to give Metoprolol 25mg PO.     Cardiac: history of LBBB, nonischemic cardiomyopathy, and non-obstructive coronary artery disease, well controlled on Metoprolol 50mg ER qd. Developed AFib in PACU which responded well to Metoprolol 2.5mg IV. Cardiology consulted - recommended Metoprolol 25mg PO now and additional Metoprolol 25mg PO at 2300. STAT H&H sent, reviewed and stable. As per Dr. Carlos, consider troponin, CK-MB, and additional blood work if patient starts to develop symptoms.   Pulmonary: no active disease, incentive spirometer at bedside.   Neuro: pain well controlled with current regimen.   Endo: Chronic adrenal insufficiency on Hydrocortisone 10mg PO in AM, 5mg in PM. Will give Hydrocortisone 25mg IV q8h as per outpatient Endocrinologist's recommendation. History of hypothyroidism on Synthroid 88mcg qd. Will continue.   GI: tolerating regular diet   : Mascorro catheter in place. Active TOV in AM.   Heme: SCDs when not ambulating. CBC wnl will continue with AM labs.   FEN: IVF at 75cc/hr, will discontinue once PO intake is adequate. Electrolytes wnl. Will continue AM labs.   Dispo: Admission to floor due to complexity of case and development of AFib in PACU. Will f/u formal Cardiology consult. As per Dr. Carlos, if patient remains asymptomatic overnight and in NSR, stable for DC in AM with F/U as outpatient.     d/w Dr. Tuttle

## 2020-06-16 NOTE — DISCHARGE NOTE PROVIDER - CARE PROVIDER_API CALL
Vincent Tuttle)  Obstetrics and Gynecology; Urogynecology  [address] [place] [address]  Phone: [phone]  Fax: [phone]  Follow Up Time:

## 2020-06-16 NOTE — DISCHARGE NOTE PROVIDER - NSDCCPCAREPLAN_GEN_ALL_CORE_FT
PRINCIPAL DISCHARGE DIAGNOSIS  Diagnosis: Uterine prolapse  Assessment and Plan of Treatment:       SECONDARY DISCHARGE DIAGNOSES  Diagnosis: Atrial fibrillation  Assessment and Plan of Treatment:

## 2020-06-16 NOTE — PROGRESS NOTE ADULT - SUBJECTIVE AND OBJECTIVE BOX
Name: CECE ARMENDARIZ  MRN: [id number]  Date Admitted: 06-16-20  Location: [facility] PACU 2600 10 ([facility] PACU)  Attending: Vincent Tuttle  All: No Known Allergies    Progress Note    CECE ARMENDARIZ is a 79y s/p total vaginal hysterectomy, colpectomy, perineorrhaphy suburethral sling, and cystoscopy due to stage 4 prolapse, POD #0, HD #1.    SUBJECTIVE:    Patient was seen and examined at bedside. Pt reports pain is well controlled with PRN pain medication. Mild nausea but is tolerating few sips of ginger ale and crackers. Mascorro catheter in place draining green urine. Denies fever, CP, SOB, or vaginal bleeding.    OBJECTIVE:   T(C): 36.4 (06-16-20 @ 15:15), Max: 36.8 (06-16-20 @ 09:28)  T(F): 97.6 (06-16-20 @ 15:15), Max: 98.2 (06-16-20 @ 09:28)  HR: 66 (06-16-20 @ 15:45) (58 - 78)  BP: 110/84 (06-16-20 @ 15:45) (80/44 - 124/58)  RR: 14 (06-16-20 @ 15:45) (12 - 23)  SpO2: 98% (06-16-20 @ 15:45) (95% - 100%)    Physical Exam:  Gen: NAD, AOx3  Lungs: CTABL  CV: S1S2, RRR  Abd: Soft, non-tender, non-distended, no guarding or rebound tenderness.   Ext: No calf tenderness bilaterally, Gregoria's Sign negative bilaterally.   : No active vaginal bleeding.    Urine Output:     LABS:  Complete Blood Count (06.16.20 @ 15:22)    WBC Count: 20.30 K/uL    RBC Count: 4.14 M/uL    Hemoglobin: 12.4 g/dL    Hematocrit: 37.7 %    Mean Cell Volume: 91.1 fl    Mean Cell Hemoglobin: 30.0 pg    Mean Cell Hemoglobin Conc: 32.9 gm/dL    Red Cell Distrib Width: 14.3 %    Platelet Count - Automated: 397 K/uL    Hospital Meds:  fentaNYL    Injectable 25 MICROGram(s) IV Push every 5 minutes PRN  hydrocortisone sodium succinate Injectable 25 milliGRAM(s) IV Push every 8 hours  ketorolac   Injectable 30 milliGRAM(s) IV Push every 8 hours PRN  lactated ringers. 1000 milliLiter(s) IV Continuous <Continuous>  ondansetron Injectable 4 milliGRAM(s) IV Push once PRN  oxycodone    5 mG/acetaminophen 325 mG 1 Tablet(s) Oral every 4 hours PRN  oxycodone    5 mG/acetaminophen 325 mG 2 Tablet(s) Oral every 6 hours PRN Name: CECE ARMENDARIZ  MRN: [id number]  Date Admitted: 06-16-20  Location: [facility] PACU 2600 10 ([facility] PACU)  Attending: Vincent Tuttle  All: No Known Allergies    Progress Note    CECE ARMENDARIZ is a 79y s/p total vaginal hysterectomy, colpectomy, perineorrhaphy suburethral sling, and cystoscopy due to stage 4 prolapse, POD #0, HD #1.    SUBJECTIVE:    Patient was seen and examined at bedside. Pt reports pain is well controlled with PRN pain medication. Mild nausea but is tolerating few sips of ginger ale and crackers. Mascorro catheter in place draining green urine. Denies fever, CP, SOB, or vaginal bleeding.    OBJECTIVE:   T(C): 36.4 (06-16-20 @ 15:15), Max: 36.8 (06-16-20 @ 09:28)  T(F): 97.6 (06-16-20 @ 15:15), Max: 98.2 (06-16-20 @ 09:28)  HR: 66 (06-16-20 @ 15:45) (58 - 78)  BP: 110/84 (06-16-20 @ 15:45) (80/44 - 124/58)  RR: 14 (06-16-20 @ 15:45) (12 - 23)  SpO2: 98% (06-16-20 @ 15:45) (95% - 100%)    Physical Exam:  Gen: NAD, AOx3  Lungs: CTABL  CV: S1S2, RRR  Abd: Soft, non-tender, non-distended, no guarding or rebound tenderness.   Ext: No calf tenderness bilaterally, Gregoria's Sign negative bilaterally.   : No active vaginal bleeding.    Urine Output: 50cc    LABS:  Complete Blood Count (06.16.20 @ 15:22)    WBC Count: 20.30 K/uL    RBC Count: 4.14 M/uL    Hemoglobin: 12.4 g/dL    Hematocrit: 37.7 %    Mean Cell Volume: 91.1 fl    Mean Cell Hemoglobin: 30.0 pg    Mean Cell Hemoglobin Conc: 32.9 gm/dL    Red Cell Distrib Width: 14.3 %    Platelet Count - Automated: 397 K/uL    Hospital Meds:  fentaNYL    Injectable 25 MICROGram(s) IV Push every 5 minutes PRN  hydrocortisone sodium succinate Injectable 25 milliGRAM(s) IV Push every 8 hours  ketorolac   Injectable 30 milliGRAM(s) IV Push every 8 hours PRN  lactated ringers. 1000 milliLiter(s) IV Continuous <Continuous>  ondansetron Injectable 4 milliGRAM(s) IV Push once PRN  oxycodone    5 mG/acetaminophen 325 mG 1 Tablet(s) Oral every 4 hours PRN  oxycodone    5 mG/acetaminophen 325 mG 2 Tablet(s) Oral every 6 hours PRN

## 2020-06-16 NOTE — BRIEF OPERATIVE NOTE - NSICDXBRIEFPREOP_GEN_ALL_CORE_FT
PRE-OP DIAGNOSIS:  Rectocele 16-Jun-2020 13:42:41  Tata Guerrero  Complete uterovaginal prolapse 16-Jun-2020 13:42:34  Tata Guerrero  Urinary, incontinence, stress female 16-Jun-2020 13:42:27  Tata Guerrero

## 2020-06-17 ENCOUNTER — TRANSCRIPTION ENCOUNTER (OUTPATIENT)
Age: 80
End: 2020-06-17

## 2020-06-17 VITALS
SYSTOLIC BLOOD PRESSURE: 109 MMHG | DIASTOLIC BLOOD PRESSURE: 65 MMHG | TEMPERATURE: 99 F | HEART RATE: 64 BPM | OXYGEN SATURATION: 94 % | RESPIRATION RATE: 18 BRPM

## 2020-06-17 LAB
ANION GAP SERPL CALC-SCNC: 10 MMOL/L — SIGNIFICANT CHANGE UP (ref 5–17)
ANISOCYTOSIS BLD QL: SLIGHT — SIGNIFICANT CHANGE UP
BASOPHILS # BLD AUTO: 0.16 K/UL — SIGNIFICANT CHANGE UP (ref 0–0.2)
BASOPHILS NFR BLD AUTO: 0.9 % — SIGNIFICANT CHANGE UP (ref 0–2)
BUN SERPL-MCNC: 20 MG/DL — SIGNIFICANT CHANGE UP (ref 8–20)
CALCIUM SERPL-MCNC: 8.6 MG/DL — SIGNIFICANT CHANGE UP (ref 8.6–10.2)
CHLORIDE SERPL-SCNC: 98 MMOL/L — SIGNIFICANT CHANGE UP (ref 98–107)
CO2 SERPL-SCNC: 22 MMOL/L — SIGNIFICANT CHANGE UP (ref 22–29)
CREAT SERPL-MCNC: 1.06 MG/DL — SIGNIFICANT CHANGE UP (ref 0.5–1.3)
EOSINOPHIL # BLD AUTO: 0 K/UL — SIGNIFICANT CHANGE UP (ref 0–0.5)
EOSINOPHIL NFR BLD AUTO: 0 % — SIGNIFICANT CHANGE UP (ref 0–6)
GIANT PLATELETS BLD QL SMEAR: PRESENT — SIGNIFICANT CHANGE UP
GLUCOSE SERPL-MCNC: 136 MG/DL — HIGH (ref 70–99)
HCT VFR BLD CALC: 34.4 % — LOW (ref 34.5–45)
HGB BLD-MCNC: 11.3 G/DL — LOW (ref 11.5–15.5)
LYMPHOCYTES # BLD AUTO: 0.9 K/UL — LOW (ref 1–3.3)
LYMPHOCYTES # BLD AUTO: 5.2 % — LOW (ref 13–44)
MACROCYTES BLD QL: SLIGHT — SIGNIFICANT CHANGE UP
MANUAL SMEAR VERIFICATION: SIGNIFICANT CHANGE UP
MCHC RBC-ENTMCNC: 29.7 PG — SIGNIFICANT CHANGE UP (ref 27–34)
MCHC RBC-ENTMCNC: 32.8 GM/DL — SIGNIFICANT CHANGE UP (ref 32–36)
MCV RBC AUTO: 90.3 FL — SIGNIFICANT CHANGE UP (ref 80–100)
MONOCYTES # BLD AUTO: 0.45 K/UL — SIGNIFICANT CHANGE UP (ref 0–0.9)
MONOCYTES NFR BLD AUTO: 2.6 % — SIGNIFICANT CHANGE UP (ref 2–14)
NEUTROPHILS # BLD AUTO: 15.84 K/UL — HIGH (ref 1.8–7.4)
NEUTROPHILS NFR BLD AUTO: 88.7 % — HIGH (ref 43–77)
NEUTS BAND # BLD: 2.6 % — SIGNIFICANT CHANGE UP (ref 0–8)
OVALOCYTES BLD QL SMEAR: SIGNIFICANT CHANGE UP
PLAT MORPH BLD: NORMAL — SIGNIFICANT CHANGE UP
PLATELET # BLD AUTO: 422 K/UL — HIGH (ref 150–400)
POIKILOCYTOSIS BLD QL AUTO: SIGNIFICANT CHANGE UP
POTASSIUM SERPL-MCNC: 4.8 MMOL/L — SIGNIFICANT CHANGE UP (ref 3.5–5.3)
POTASSIUM SERPL-SCNC: 4.8 MMOL/L — SIGNIFICANT CHANGE UP (ref 3.5–5.3)
RBC # BLD: 3.81 M/UL — SIGNIFICANT CHANGE UP (ref 3.8–5.2)
RBC # FLD: 14.4 % — SIGNIFICANT CHANGE UP (ref 10.3–14.5)
RBC BLD AUTO: ABNORMAL
SARS-COV-2 IGG SERPL QL IA: NEGATIVE — SIGNIFICANT CHANGE UP
SARS-COV-2 IGM SERPL IA-ACNC: 0.1 INDEX — SIGNIFICANT CHANGE UP
SODIUM SERPL-SCNC: 130 MMOL/L — LOW (ref 135–145)
WBC # BLD: 17.35 K/UL — HIGH (ref 3.8–10.5)
WBC # FLD AUTO: 17.35 K/UL — HIGH (ref 3.8–10.5)

## 2020-06-17 PROCEDURE — 88302 TISSUE EXAM BY PATHOLOGIST: CPT

## 2020-06-17 PROCEDURE — 82962 GLUCOSE BLOOD TEST: CPT

## 2020-06-17 PROCEDURE — 85027 COMPLETE CBC AUTOMATED: CPT

## 2020-06-17 PROCEDURE — 86769 SARS-COV-2 COVID-19 ANTIBODY: CPT

## 2020-06-17 PROCEDURE — C1771: CPT

## 2020-06-17 PROCEDURE — 93005 ELECTROCARDIOGRAM TRACING: CPT

## 2020-06-17 PROCEDURE — 80048 BASIC METABOLIC PNL TOTAL CA: CPT

## 2020-06-17 PROCEDURE — 36415 COLL VENOUS BLD VENIPUNCTURE: CPT

## 2020-06-17 PROCEDURE — 88305 TISSUE EXAM BY PATHOLOGIST: CPT

## 2020-06-17 RX ADMIN — Medication 100 MILLIGRAM(S): at 03:18

## 2020-06-17 RX ADMIN — Medication 88 MICROGRAM(S): at 05:54

## 2020-06-17 RX ADMIN — Medication 25 MILLIGRAM(S): at 03:19

## 2020-06-17 RX ADMIN — SODIUM CHLORIDE 3 MILLILITER(S): 9 INJECTION INTRAMUSCULAR; INTRAVENOUS; SUBCUTANEOUS at 13:05

## 2020-06-17 NOTE — PROGRESS NOTE ADULT - ASSESSMENT
79y s/p total vaginal hysterectomy, colpectomy, perineorrhaphy suburethral sling, and cystoscopy due to stage 4 prolapse, POD #1, HD #2.79y s/p total vaginal hysterectomy, colpectomy, perineorrhaphy suburethral sling, and cystoscopy due to stage 4 prolapse, POD #1, HD #2.    Cardiac: history of LBBB, nonischemic cardiomyopathy, and non-obstructive coronary artery disease, well controlled on Metoprolol 50mg ER qd. Developed paroxysmal AFib in PACU secondary to medication non-compliance, successfully pharmacologically cardioverted to NSR. NSR overnight. Appreciate Cardiology recs. Cleared for discharge with follow up with patient's Cardiologist in 1 week.   Pulmonary: no active disease, incentive spirometer at bedside.   Neuro: pain well controlled with current regimen.   Endo: Chronic adrenal insufficiency on Hydrocortisone 10mg PO in AM, 5mg in PM. Will give Hydrocortisone 25mg IV q8h as per outpatient Endocrinologist's recommendation. History of hypothyroidism on Synthroid 88mcg qd. Will continue.   GI: tolerating regular diet   : 400cc back-filled into bladder, 500cc voided. Bladder scan shows 11cc. Passed active TOV.  Heme: SCDs when not ambulating. CBC pending this AM.   FEN: IVF at 100cc/hr  Dispo: as per Atteding's discretion    d/w Dr. Tuttle

## 2020-06-17 NOTE — PROGRESS NOTE ADULT - SUBJECTIVE AND OBJECTIVE BOX
Name: CECE ARMENDARIZ  MRN: [id number]  Date Admitted: 06-16-20  Location: [facility] 0247 01 ([facility])  Attending: Vincent Tuttle  All: No Known Allergies    Progress Note    CECE ARMENDARIZ is a 79y s/p total vaginal hysterectomy, colpectomy, perineorrhaphy suburethral sling, and cystoscopy due to stage 4 prolapse, POD #1, HD #2.    SUBJECTIVE:    Patient was seen and examined at bedside. No acute events overnight. Pt reports pain is well controlled with PRN pain medication. Tolerates PO intake without N/V. Patient passed active TOV this AM - 400cc back-filled, 500cc voided. Bladder scan 11cc. No problems with ambulation. Denies flatus, BM, headache, dizziness, CP, palpitations, or SOB    OBJECTIVE:   T(C): 36.4 (06-17-20 @ 04:50), Max: 36.8 (06-16-20 @ 09:28)  T(F): 97.5 (06-17-20 @ 04:50), Max: 98.2 (06-16-20 @ 09:28)  HR: 57 (06-17-20 @ 04:50) (57 - 155)  BP: 105/58 (06-17-20 @ 04:50) (80/44 - 143/68)  RR: 19 (06-17-20 @ 04:50) (12 - 23)  SpO2: 95% (06-17-20 @ 04:50) (95% - 100%)    Physical Exam:  Gen: NAD, AOx3  CV: S1S2, RRR  Lungs: CTABL, Speaking in full sentences without SOB.  Abd: Soft, non-tender, non-distended, no guarding or rebound tenderness. +BS.   Ext: No calf tenderness bilaterally, Gregoria's Sign negative bilaterally.   : No active vaginal bleeding.   Incision: suprapubic incision c/d/i with dermabond    LABS:  Pending this AM    06-16-20 @ 07:01  -  06-17-20 @ 06:27  --------------------------------------------------------  IN: 0 mL / OUT: 1600 mL / NET: -1600 mL    Hospital Meds:  lactated ringers. 1000 milliLiter(s) IV Continuous <Continuous>  levothyroxine 88 MICROGram(s) Oral daily  metoprolol succinate ER 50 milliGRAM(s) Oral daily  ondansetron Injectable 4 milliGRAM(s) IV Push every 6 hours PRN  oxycodone    5 mG/acetaminophen 325 mG 1 Tablet(s) Oral every 4 hours PRN  oxycodone    5 mG/acetaminophen 325 mG 2 Tablet(s) Oral every 6 hours PRN  polyethylene glycol 3350 17 Gram(s) Oral at bedtime  sodium chloride 0.9% lock flush 3 milliLiter(s) IV Push every 8 hours

## 2020-06-17 NOTE — PROGRESS NOTE ADULT - ATTENDING COMMENTS
Patient seen and examined. Agree with above. Passed TOV. NSR overnight. Has not had regular diet. WIll advance and check on later today to determine if  able to go home.

## 2020-06-17 NOTE — DISCHARGE NOTE NURSING/CASE MANAGEMENT/SOCIAL WORK - PATIENT PORTAL LINK FT
You can access the FollowMyHealth Patient Portal offered by [facility] by registering at the following website: [email or web address]. By joining [employer]’s FollowMyHealth portal, you will also be able to view your health information using other applications (apps) compatible with our system.

## 2020-06-18 DIAGNOSIS — G89.18 OTHER ACUTE POSTPROCEDURAL PAIN: ICD-10-CM

## 2020-06-18 DIAGNOSIS — Z71.89 OTHER SPECIFIED COUNSELING: ICD-10-CM

## 2020-06-25 ENCOUNTER — APPOINTMENT (OUTPATIENT)
Age: 80
End: 2020-06-25
Payer: MEDICARE

## 2020-06-25 ENCOUNTER — RESULT CHARGE (OUTPATIENT)
Age: 80
End: 2020-06-25

## 2020-06-25 VITALS — SYSTOLIC BLOOD PRESSURE: 119 MMHG | DIASTOLIC BLOOD PRESSURE: 75 MMHG

## 2020-06-25 LAB
BILIRUB UR QL STRIP: NEGATIVE
CLARITY UR: CLEAR
COLLECTION METHOD: NORMAL
GLUCOSE UR-MCNC: NEGATIVE
HCG UR QL: 0.2 EU/DL
HGB UR QL STRIP.AUTO: NEGATIVE
KETONES UR-MCNC: NEGATIVE
LEUKOCYTE ESTERASE UR QL STRIP: NORMAL
NITRITE UR QL STRIP: NEGATIVE
PH UR STRIP: 6.5
PROT UR STRIP-MCNC: NEGATIVE
SP GR UR STRIP: 1.01

## 2020-06-25 PROCEDURE — 99024 POSTOP FOLLOW-UP VISIT: CPT

## 2020-06-25 NOTE — SUBJECTIVE
[FreeTextEntry7] : minimal [FreeTextEntry6] : tolerating diet [FreeTextEntry1] : feels well [FreeTextEntry5] : no complaints, no leakage of urine, no frequency/urgency, denies incomplete emptying  [FreeTextEntry4] : no complaints [FreeTextEntry9] : feels itching vaginally, posterior extending to rectum

## 2020-06-25 NOTE — DISCUSSION/SUMMARY
[Post-Op instructions given. Pt/family verbalizes understanding] : post-operative instructions were provided to the patient/family who verbalize understanding [FreeTextEntry1] : path benign\par pt is 9 days s/p TVH, colpectomy, sling\par doing well however reports itching posteriorly near rectum extending, discussed hydrocortisone cream to the area to decrease itching\par precautions reviewed, will f/u in 4 wks or sooner if her symptoms persist\par all questions were answered [Risks/Benefits discussed. Pt/family verbalizes understanding] : risks and benefits of the procedure were discussed with the patient/family who verbalize understanding

## 2020-06-25 NOTE — OBJECTIVE
[Voiding Trial] : No voiding trial was performed [Post Void Residual ____ ml] : Post Void Residual was [unfilled] ml [Soft and Nontender] : soft and nontender [Clean, Dry, Intact] : Clean, Dry, Intact [Healing well] : healing well [Good Support] : Good support [No Masses or Tenderness] : no masses or tenderness [FreeTextEntry3] : well healing incisions, no signs of infection,

## 2020-06-29 ENCOUNTER — APPOINTMENT (OUTPATIENT)
Dept: UROGYNECOLOGY | Facility: CLINIC | Age: 80
End: 2020-06-29

## 2020-07-07 ENCOUNTER — RX RENEWAL (OUTPATIENT)
Age: 80
End: 2020-07-07

## 2020-07-27 ENCOUNTER — RX RENEWAL (OUTPATIENT)
Age: 80
End: 2020-07-27

## 2020-07-27 ENCOUNTER — APPOINTMENT (OUTPATIENT)
Dept: UROGYNECOLOGY | Facility: CLINIC | Age: 80
End: 2020-07-27
Payer: MEDICARE

## 2020-08-03 ENCOUNTER — APPOINTMENT (OUTPATIENT)
Dept: UROGYNECOLOGY | Facility: CLINIC | Age: 80
End: 2020-08-03
Payer: MEDICARE

## 2020-08-04 ENCOUNTER — APPOINTMENT (OUTPATIENT)
Dept: ENDOCRINOLOGY | Facility: CLINIC | Age: 80
End: 2020-08-04
Payer: MEDICARE

## 2020-08-04 PROCEDURE — 99443: CPT | Mod: 95

## 2020-08-04 RX ORDER — TRIAMCINOLONE ACETONIDE 1 MG/G
0.1 OINTMENT TOPICAL
Qty: 90 | Refills: 0 | Status: DISCONTINUED | COMMUNITY
Start: 2020-06-01

## 2020-08-04 RX ORDER — NYSTATIN AND TRIAMCINOLONE ACETONIDE 100000; 1 MG/G; MG/G
100000-0.1 CREAM TOPICAL
Qty: 30 | Refills: 0 | Status: DISCONTINUED | COMMUNITY
Start: 2020-03-29

## 2020-08-04 RX ORDER — HYDROCORTISONE 10 MG/G
1 CREAM TOPICAL
Qty: 28 | Refills: 0 | Status: DISCONTINUED | COMMUNITY
Start: 2020-07-16

## 2020-08-04 RX ORDER — FLUTICASONE PROPIONATE 0.05 MG/G
0.01 OINTMENT TOPICAL
Qty: 60 | Refills: 0 | Status: DISCONTINUED | COMMUNITY
Start: 2019-03-03 | End: 2020-08-04

## 2020-08-04 RX ORDER — FLUCONAZOLE 150 MG/1
150 TABLET ORAL
Qty: 2 | Refills: 0 | Status: DISCONTINUED | COMMUNITY
Start: 2020-03-28

## 2020-08-04 RX ORDER — TOBRAMYCIN AND DEXAMETHASONE 3; 1 MG/ML; MG/ML
0.3-0.1 SUSPENSION/ DROPS OPHTHALMIC
Qty: 5 | Refills: 0 | Status: DISCONTINUED | COMMUNITY
Start: 2020-05-21

## 2020-08-04 RX ORDER — TRIAMCINOLONE ACETONIDE 1 MG/G
0.1 CREAM TOPICAL
Qty: 30 | Refills: 0 | Status: DISCONTINUED | COMMUNITY
Start: 2020-05-06

## 2020-08-04 RX ORDER — OXYCODONE AND ACETAMINOPHEN 5; 325 MG/1; MG/1
5-325 TABLET ORAL
Qty: 10 | Refills: 0 | Status: DISCONTINUED | COMMUNITY
Start: 2020-06-18 | End: 2020-08-04

## 2020-08-04 RX ORDER — AMOXICILLIN 500 MG/1
500 TABLET, FILM COATED ORAL 3 TIMES DAILY
Qty: 30 | Refills: 0 | Status: DISCONTINUED | COMMUNITY
Start: 2019-07-23 | End: 2020-08-04

## 2020-08-04 RX ORDER — AMOXICILLIN 500 MG/1
500 CAPSULE ORAL EVERY 8 HOURS
Qty: 30 | Refills: 0 | Status: DISCONTINUED | COMMUNITY
Start: 2019-07-19 | End: 2020-08-04

## 2020-08-04 NOTE — REASON FOR VISIT
[Follow - Up] : a follow-up visit [Adrenal Evaluation/Adrenal Disorder] : adrenal evaluation/adrenal disorder

## 2020-08-04 NOTE — ASSESSMENT
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 stopped statin and does not want to start statin. \par - follow a low fat diet\par - repeat lipids in 6 weeks. \par \par Follow up visit in 6 weeks.\par \par Start TIme: 2:30 \par End TIme: 2:53 [Levothyroxine] : The patient was instructed to take Levothyroxine on an empty stomach, separate from vitamins, and wait at least 30 minutes before eating

## 2020-08-04 NOTE — REVIEW OF SYSTEMS
[Fatigue] : fatigue [Visual Field Defect] : no visual field defect [Recent Weight Gain (___ Lbs)] : no recent weight gain [Recent Weight Loss (___ Lbs)] : no recent weight loss [Blurred Vision] : no blurred vision [Dysphagia] : no dysphagia [Neck Pain] : no neck pain [Dysphonia] : no dysphonia [Palpitations] : palpitations [Dry Skin] : no dry skin [Headaches] : headaches [Hair Loss] : hair loss [Dizziness] : dizziness [Tremors] : no tremors [Cold Intolerance] : no cold intolerance [Anxiety] : anxiety [Heat Intolerance] : no heat intolerance [FreeTextEntry2] : weight stable  [FreeTextEntry5] : sometimes, followed by cardiology

## 2020-08-31 ENCOUNTER — RESULT CHARGE (OUTPATIENT)
Age: 80
End: 2020-08-31

## 2020-08-31 ENCOUNTER — APPOINTMENT (OUTPATIENT)
Dept: UROGYNECOLOGY | Facility: CLINIC | Age: 80
End: 2020-08-31
Payer: MEDICARE

## 2020-08-31 VITALS
BODY MASS INDEX: 30.02 KG/M2 | SYSTOLIC BLOOD PRESSURE: 150 MMHG | HEIGHT: 58 IN | WEIGHT: 143 LBS | DIASTOLIC BLOOD PRESSURE: 84 MMHG

## 2020-08-31 DIAGNOSIS — Z98.890 OTHER SPECIFIED POSTPROCEDURAL STATES: ICD-10-CM

## 2020-08-31 PROCEDURE — 99024 POSTOP FOLLOW-UP VISIT: CPT

## 2020-08-31 NOTE — PHYSICAL EXAM
[Chaperone Present] : A chaperone was present in the examining room during all aspects of the physical examination [Normal Appearance] : general appearance was normal [Absent] : absent [Normal] : no abnormalities [FreeTextEntry4] : TVL 3cm. No exposure, no prolapse

## 2020-08-31 NOTE — DISCUSSION/SUMMARY
[FreeTextEntry1] : 2 months s/p tvh colpectomy and sling doing very well. Linda is very happy with the outcome of the surgery. I cleared her to gradually return to regular activities. I asked her to followup in about 4 months. She is following up with cardiology. She had a transient bout of a fib in the recovery room that responded very quickly.

## 2020-08-31 NOTE — HISTORY OF PRESENT ILLNESS
[FreeTextEntry1] : Patient is a 79 y.o here for 2 month post-op visit s/p TVH, colpectomy, suburethral sling, perineorrhaphy, and cystourethroscopy..\par \par Patient overall feeling well. Reports minimal pain along pelvic region. Denies any vaginal bleeding, or purulent discharge. Denies any leakage of urine with coughing/sneezing. Reports increased urinary frequency (but believes it is secondary to increased PO intake). Reports complete emptying of bladder. Reports resolution of vaginal  itching after using cortisone cream. Reports regular bowel movements. No longer takes stool softeners for prior constipation. Otherwise, no additional complaints at this time. Patient overall satisfied with care and outcome of operation.

## 2020-09-29 ENCOUNTER — APPOINTMENT (OUTPATIENT)
Dept: ENDOCRINOLOGY | Facility: CLINIC | Age: 80
End: 2020-09-29
Payer: MEDICARE

## 2020-09-29 DIAGNOSIS — Z86.39 PERSONAL HISTORY OF OTHER ENDOCRINE, NUTRITIONAL AND METABOLIC DISEASE: ICD-10-CM

## 2020-09-29 PROCEDURE — 36415 COLL VENOUS BLD VENIPUNCTURE: CPT

## 2020-09-30 LAB
CHOLEST SERPL-MCNC: 235 MG/DL
CHOLEST/HDLC SERPL: 5.4 RATIO
HDLC SERPL-MCNC: 43 MG/DL
LDLC SERPL CALC-MCNC: 166 MG/DL
T3FREE SERPL-MCNC: 3.75 PG/ML
T4 FREE SERPL-MCNC: 2.3 NG/DL
TRIGL SERPL-MCNC: 132 MG/DL
TSH SERPL-ACNC: 0.21 UIU/ML

## 2020-10-08 ENCOUNTER — APPOINTMENT (OUTPATIENT)
Dept: ENDOCRINOLOGY | Facility: CLINIC | Age: 80
End: 2020-10-08
Payer: MEDICARE

## 2020-10-08 VITALS
HEART RATE: 82 BPM | WEIGHT: 135 LBS | SYSTOLIC BLOOD PRESSURE: 130 MMHG | HEIGHT: 58 IN | BODY MASS INDEX: 28.34 KG/M2 | DIASTOLIC BLOOD PRESSURE: 82 MMHG

## 2020-10-08 DIAGNOSIS — E27.9 DISORDER OF ADRENAL GLAND, UNSPECIFIED: ICD-10-CM

## 2020-10-08 PROCEDURE — 99214 OFFICE O/P EST MOD 30 MIN: CPT

## 2020-10-08 RX ORDER — HYDROCORTISONE 10 MG/G
1 OINTMENT TOPICAL TWICE DAILY
Qty: 1 | Refills: 0 | Status: DISCONTINUED | COMMUNITY
Start: 2020-06-25 | End: 2020-10-08

## 2020-10-08 NOTE — PHYSICAL EXAM
[Alert] : alert [Well Nourished] : well nourished [No Acute Distress] : no acute distress [Well Developed] : well developed [Normal Sclera/Conjunctiva] : normal sclera/conjunctiva [EOMI] : extra ocular movement intact [No LAD] : no lymphadenopathy [Thyroid Not Enlarged] : the thyroid was not enlarged [No Thyroid Nodules] : no palpable thyroid nodules [No Respiratory Distress] : no respiratory distress [Normal Rate and Effort] : normal respiratory rate and effort [Clear to Auscultation] : lungs were clear to auscultation bilaterally [Normal S1, S2] : normal S1 and S2 [Normal Rate] : heart rate was normal [Regular Rhythm] : with a regular rhythm [No Edema] : no peripheral edema [Normal Bowel Sounds] : normal bowel sounds [Not Tender] : non-tender [Soft] : abdomen soft [Kyphosis] : kyphosis present [No Rash] : no rash [Acanthosis Nigricans] : no acanthosis nigricans [No Tremors] : no tremors [Oriented x3] : oriented to person, place, and time [Normal Insight/Judgement] : insight and judgment were intact [Normal Mood] : the mood was normal

## 2020-10-08 NOTE — ASSESSMENT
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, Hypothyroidism, and HLD.\par \par Plan: \par Titus's disease - well controlled \par - continue HC 10 mg in am and 5 mg in pm \par  reviewed sick day rules with pt- if cannot hold down HC pills- needs to go to ER \par  otherwise for mild- illness - double steroids for a few days until feels better then go back to usual dosing \par \par Hypothyroidism: increased anxiety, decrease LT4 to 100 mcg daily\par - educated on the importance of taking LT4 on empty stomach and waiting at least 30 minutes to eat or drink anything \par - repeat TFTs in 4-6 weeks \par \par HLD- elevated, discontinued statin in the past and does not want to restart statin \par - follow a low fat diet\par - repeat lipids in 3 months \par \par Follow up visit in 3 months.  [Levothyroxine] : The patient was instructed to take Levothyroxine on an empty stomach, separate from vitamins, and wait at least 30 minutes before eating

## 2020-10-08 NOTE — HISTORY OF PRESENT ILLNESS
[FreeTextEntry1] : Quality: Titus's Disease\par Severity: moderate\par Duration: over 13 years\par Onset: weight loss and labs \par Modifying Factors: Better with medication \par Associated Symptoms: +fatigue, nervous, head is foggy, denies darkening of the skin, no low blood pressure \par \par \par Current Regimen:\par  HC 10 mg in AM and 5 mg in PM \par - LT4 112 mcg daily- sometimes with Tea\par \par + migraines, takes Tylenol\par feels anxious\par \par Total hysterectomy 06/2020\par \par Labs reviewed: \par 9/30/20\par Cholesterol 235\par \par Triglycerides 132\par TSH 0.21\par Free T4 2.3\par \par Diet: uncontrolled diet, eating everything\par

## 2020-10-08 NOTE — REVIEW OF SYSTEMS
[Fatigue] : fatigue [Blurred Vision] : blurred vision [Dry Skin] : dry skin [Hair Loss] : hair loss [Depression] : depression [Decreased Appetite] : appetite not decreased [Dysphagia] : no dysphagia [Neck Pain] : no neck pain [Chest Pain] : no chest pain [Palpitations] : no palpitations [Constipation] : no constipation [Diarrhea] : no diarrhea [Suicidal Ideation] : no suicidal ideation [Cold Intolerance] : no cold intolerance [Heat Intolerance] : no heat intolerance [Swelling] : no swelling [FreeTextEntry3] : sees opthalmology  [de-identified] : does not need medication as per pt

## 2020-11-23 ENCOUNTER — NON-APPOINTMENT (OUTPATIENT)
Age: 80
End: 2020-11-23

## 2020-12-15 PROBLEM — Z87.440 HISTORY OF URINARY TRACT INFECTION: Status: RESOLVED | Noted: 2017-03-22 | Resolved: 2020-12-15

## 2020-12-18 ENCOUNTER — RX RENEWAL (OUTPATIENT)
Age: 80
End: 2020-12-18

## 2020-12-21 PROBLEM — Z87.09 HISTORY OF ACUTE SINUSITIS: Status: RESOLVED | Noted: 2019-07-19 | Resolved: 2020-12-21

## 2021-01-07 ENCOUNTER — APPOINTMENT (OUTPATIENT)
Dept: ENDOCRINOLOGY | Facility: CLINIC | Age: 81
End: 2021-01-07

## 2021-01-08 ENCOUNTER — APPOINTMENT (OUTPATIENT)
Dept: ENDOCRINOLOGY | Facility: CLINIC | Age: 81
End: 2021-01-08
Payer: MEDICARE

## 2021-01-08 VITALS
SYSTOLIC BLOOD PRESSURE: 120 MMHG | HEIGHT: 58 IN | DIASTOLIC BLOOD PRESSURE: 70 MMHG | BODY MASS INDEX: 28.55 KG/M2 | OXYGEN SATURATION: 95 % | HEART RATE: 64 BPM | WEIGHT: 136 LBS

## 2021-01-08 PROCEDURE — 99214 OFFICE O/P EST MOD 30 MIN: CPT

## 2021-01-08 NOTE — ASSESSMENT
[Levothyroxine] : The patient was instructed to take Levothyroxine on an empty stomach, separate from vitamins, and wait at least 30 minutes before eating [FreeTextEntry1] : 80 year old female with [place]'s disease, Hypothyroidism, hyperlipidemia, and prediabetes. \par \par 1. Titus's disease\par -Continue HC 10 mg in AM and 5 mg in PM. Can take afternoon dose in the evening instead of right before bed.\par -Sick day rules:  if cannot hold down HC pills- needs to go to ER. Otherwise for mild- illness - double steroids for a few days until feeling better then go back to usual dosing \par \par 2. Hypothyroidism\par -Continue current dose of LT4.\par -Repeat TFTs now.\par -Reviewed proper administration of LT4.\par \par 3. Hyperlipidemia- discontinued statin in the past and does not want to restart.\par -Check lipids now.\par \par 4. Prediabetes\par -Check A1c now.\par -Discussed importance of lifestyle modifications- diet, exercise, and weight loss- to maintain glycemic control.\par \par

## 2021-01-08 NOTE — REASON FOR VISIT
[Follow - Up] : a follow-up visit [Adrenal Evaluation/Adrenal Disorder] : adrenal evaluation/adrenal disorder [Hypothyroidism] : hypothyroidism [Other___] : [unfilled]

## 2021-01-08 NOTE — REVIEW OF SYSTEMS
[Fatigue] : fatigue [Nasal Congestion] : nasal congestion [Shortness Of Breath] : shortness of breath [Headaches] : headaches [Anxiety] : anxiety [Recent Weight Gain (___ Lbs)] : no recent weight gain [Recent Weight Loss (___ Lbs)] : no recent weight loss [Dysphagia] : no dysphagia [Neck Pain] : no neck pain [Chest Pain] : no chest pain [Palpitations] : no palpitations [Cough] : no cough [Constipation] : no constipation [Diarrhea] : no diarrhea [Tremors] : no tremors [Depression] : no depression

## 2021-01-08 NOTE — HISTORY OF PRESENT ILLNESS
[FreeTextEntry1] : Quality: Titus's Disease\par Severity: moderate\par Duration: over 13 years\par Onset: weight loss and labs \par Modifying Factors: Better with medication \par Associated Symptoms: +fatigue, nervous, head is foggy, denies darkening of the skin, no low blood pressure \par \par Current Regimen:\par HC 10 mg in AM and 5 mg in PM \par \par Also with hypothyroidism, on LT4 88 mcg daily, takes in AM, sometimes with tea. Dose last adjusted from 100 mcg daily in November 2020 for TSH 0.17.\par \par + migraines, takes Tylenol\par getting SOB with runny nose and sinus congestion- saw pulmonology, told she had fluid on the lungs and was given a cough suppressant and saline nasal spray which she has not tried yet\par \par Total hysterectomy 06/2020

## 2021-01-15 ENCOUNTER — RX RENEWAL (OUTPATIENT)
Age: 81
End: 2021-01-15

## 2021-01-22 ENCOUNTER — APPOINTMENT (OUTPATIENT)
Dept: UROGYNECOLOGY | Facility: CLINIC | Age: 81
End: 2021-01-22
Payer: MEDICARE

## 2021-01-22 PROCEDURE — 99213 OFFICE O/P EST LOW 20 MIN: CPT | Mod: 25

## 2021-01-22 PROCEDURE — 51701 INSERT BLADDER CATHETER: CPT

## 2021-01-22 NOTE — PROCEDURE
[Straight Catheterization] : insertion of a straight catheter [Urinary Frequency] : urinary frequency [Clear] : clear [Culture] : culture [Urinalysis] : urinalysis [No Complications] : no complications [Tolerated Well] : the patient tolerated the procedure well

## 2021-01-25 LAB — BACTERIA UR CULT: NORMAL

## 2021-01-25 NOTE — PHYSICAL EXAM
[No Acute Distress] : in no acute distress [Well developed] : well developed [Well Nourished] : ~L well nourished [Good Hygeine] : demonstrates good hygeine [Oriented x3] : oriented to person, place, and time [Normal Mood/Affect] : mood and affect are normal [Warm and Dry] : was warm and dry to touch [Normal Gait] : gait was normal [Normal] : normal external genitalia [Atrophy] : atrophy [Dry Mucosa] : dry mucosa [No Bleeding] : there was no active vaginal bleeding [Tenderness] : ~T no ~M abdominal tenderness observed [FreeTextEntry3] : possible mucosal prolapse [FreeTextEntry4] : shortened vaginal length, possible anterior wall descent

## 2021-01-25 NOTE — DISCUSSION/SUMMARY
[FreeTextEntry1] : Urine sent for UA C/S to r/o UTI- will treat if positive result\par \par Atrophy- Rx for vaginal estrogen sent to pharmacy- pt given instructions regarding use\par \par F/U with Dr. Tuttle on scheduled appointment date 3/23, or call and return to office sooner if needed \par \par Pt verbalizes understanding and advised to call office with any questions or concerns\par \par

## 2021-01-25 NOTE — HISTORY OF PRESENT ILLNESS
[FreeTextEntry1] : Linda presents to office b/c she thinks that she may have a UTI.  She c/o urinary frequency and suprapubic discomfort x 1 week.  Denies fever, chills, dysuria, hematuria, back pain.  She feels like she is emptying fully. No urine leakage.  No problems with BM.  Had TVH, colpectomy, perineorrhaphy, suburethral sling with TVT exact sling, cysto in June and is very happy with the results.  No urine leakage or vaginal bleeding reported.

## 2021-02-11 ENCOUNTER — APPOINTMENT (OUTPATIENT)
Dept: UROGYNECOLOGY | Facility: CLINIC | Age: 81
End: 2021-02-11
Payer: MEDICARE

## 2021-02-11 DIAGNOSIS — N12 TUBULO-INTERSTITIAL NEPHRITIS, NOT SPECIFIED AS ACUTE OR CHRONIC: ICD-10-CM

## 2021-02-11 PROCEDURE — 51701 INSERT BLADDER CATHETER: CPT

## 2021-02-11 PROCEDURE — 99212 OFFICE O/P EST SF 10 MIN: CPT | Mod: 25

## 2021-02-11 NOTE — PROCEDURE
[Straight Catheterization] : insertion of a straight catheter [___ Fr Straight Tip] : a [unfilled] in [place] straight tip catheter [None] : there were no complications with the catheter insertion [Culture] : culture [Urinalysis] : urinalysis

## 2021-02-12 ENCOUNTER — NON-APPOINTMENT (OUTPATIENT)
Age: 81
End: 2021-02-12

## 2021-02-12 LAB
APPEARANCE: CLEAR
BACTERIA: NEGATIVE
BILIRUBIN URINE: NEGATIVE
BLOOD URINE: NEGATIVE
COLOR: COLORLESS
GLUCOSE QUALITATIVE U: NEGATIVE
HYALINE CASTS: 0 /LPF
KETONES URINE: NEGATIVE
LEUKOCYTE ESTERASE URINE: NEGATIVE
MICROSCOPIC-UA: NORMAL
NITRITE URINE: NEGATIVE
PH URINE: 6.5
PROTEIN URINE: NEGATIVE
RED BLOOD CELLS URINE: 3 /HPF
SPECIFIC GRAVITY URINE: 1.01
SQUAMOUS EPITHELIAL CELLS: 0 /HPF
UROBILINOGEN URINE: NORMAL
WHITE BLOOD CELLS URINE: 0 /HPF

## 2021-02-12 NOTE — HISTORY OF PRESENT ILLNESS
[FreeTextEntry1] : Linda presents for repeat UA/CS.  Urine sent at last office visit (1/22) came back culture negative but positive for MH, thus MH workup initiated.   CTU performed on 2/4 suggestive of pyelonephritis.  Discussed with Dr. Tuttle.  Pt c/o suprapubic discomfort and urinary frequency.  No back pain, fever, hematuria.  Will treat empirically with Cipro 500mg po bid for 7 days. E-rx sent to pharmacy.  Advised probiotic use as well.  Urine specimen collected via catheter and sent for repeat UA/CS. F/U with cystoscopy (appt scheduled) or sooner if symptoms persist or worsen.

## 2021-02-12 NOTE — PHYSICAL EXAM
[No Acute Distress] : in no acute distress [Well developed] : well developed [Well Nourished] : ~L well nourished [Good Hygeine] : demonstrates good hygeine [Oriented x3] : oriented to person, place, and time [Normal Memory] : ~T memory was ~L unimpaired [Normal Mood/Affect] : mood and affect are normal [Tenderness] : ~T ~M abdominal tenderness observed [Suprapubic] : in the suprapubic area [None] : no CVA tenderness [Warm and Dry] : was warm and dry to touch [Normal Gait] : gait was normal [Normal] : normal external genitalia [Distended] : not distended [FreeTextEntry3] : ? mucosal prolapse and tenderness upon cleansing with iodine

## 2021-02-15 ENCOUNTER — NON-APPOINTMENT (OUTPATIENT)
Age: 81
End: 2021-02-15

## 2021-02-15 LAB — BACTERIA UR CULT: NORMAL

## 2021-03-23 ENCOUNTER — APPOINTMENT (OUTPATIENT)
Dept: UROGYNECOLOGY | Facility: CLINIC | Age: 81
End: 2021-03-23
Payer: MEDICARE

## 2021-03-23 ENCOUNTER — RESULT CHARGE (OUTPATIENT)
Age: 81
End: 2021-03-23

## 2021-03-23 DIAGNOSIS — R31.29 OTHER MICROSCOPIC HEMATURIA: ICD-10-CM

## 2021-03-23 DIAGNOSIS — R32 UNSPECIFIED URINARY INCONTINENCE: ICD-10-CM

## 2021-03-23 LAB
BILIRUB UR QL STRIP: NEGATIVE
CLARITY UR: CLEAR
COLLECTION METHOD: NORMAL
GLUCOSE UR-MCNC: NEGATIVE
HCG UR QL: 0.2 EU/DL
HGB UR QL STRIP.AUTO: NEGATIVE
KETONES UR-MCNC: NEGATIVE
LEUKOCYTE ESTERASE UR QL STRIP: NORMAL
NITRITE UR QL STRIP: NEGATIVE
PH UR STRIP: 6
PROT UR STRIP-MCNC: NEGATIVE
SP GR UR STRIP: 1.01

## 2021-03-23 PROCEDURE — 99213 OFFICE O/P EST LOW 20 MIN: CPT | Mod: 25

## 2021-03-23 PROCEDURE — 52000 CYSTOURETHROSCOPY: CPT

## 2021-03-23 RX ORDER — ESTRADIOL 0.1 MG/G
0.1 CREAM VAGINAL
Qty: 1 | Refills: 2 | Status: ACTIVE | COMMUNITY
Start: 2021-03-23 | End: 1900-01-01

## 2021-03-23 NOTE — DISCUSSION/SUMMARY
[FreeTextEntry1] : Cysto normal. We discussed the systemic side effect of vaginal estrogen and I sent an Rx to her pharmacy. Followup in 3 months.

## 2021-03-23 NOTE — HISTORY OF PRESENT ILLNESS
[FreeTextEntry1] : Here for cysto. Feeling good. Very happy with outcome of surgery. Had MH on urine. Had CT scan that showed possible pyleo. Her urine cultures have been negative.

## 2021-03-24 LAB
APPEARANCE: CLEAR
BACTERIA: NEGATIVE
BILIRUBIN URINE: NEGATIVE
BLOOD URINE: NEGATIVE
COLOR: COLORLESS
GLUCOSE QUALITATIVE U: NEGATIVE
HYALINE CASTS: 0 /LPF
KETONES URINE: NEGATIVE
LEUKOCYTE ESTERASE URINE: NEGATIVE
MICROSCOPIC-UA: NORMAL
NITRITE URINE: NEGATIVE
PH URINE: 6
PROTEIN URINE: NEGATIVE
RED BLOOD CELLS URINE: 4 /HPF
SPECIFIC GRAVITY URINE: 1
SQUAMOUS EPITHELIAL CELLS: 0 /HPF
UROBILINOGEN URINE: NORMAL
WHITE BLOOD CELLS URINE: 0 /HPF

## 2021-04-05 ENCOUNTER — APPOINTMENT (OUTPATIENT)
Dept: UROGYNECOLOGY | Facility: CLINIC | Age: 81
End: 2021-04-05

## 2021-04-14 ENCOUNTER — RX RENEWAL (OUTPATIENT)
Age: 81
End: 2021-04-14

## 2021-04-14 RX ORDER — METOPROLOL SUCCINATE 50 MG/1
50 TABLET, EXTENDED RELEASE ORAL
Qty: 90 | Refills: 0 | Status: ACTIVE | COMMUNITY
Start: 2016-05-03 | End: 1900-01-01

## 2021-04-15 ENCOUNTER — APPOINTMENT (OUTPATIENT)
Dept: UROGYNECOLOGY | Facility: CLINIC | Age: 81
End: 2021-04-15
Payer: MEDICARE

## 2021-04-15 DIAGNOSIS — R39.15 URGENCY OF URINATION: ICD-10-CM

## 2021-04-15 PROCEDURE — 99213 OFFICE O/P EST LOW 20 MIN: CPT | Mod: 25

## 2021-04-15 PROCEDURE — 51701 INSERT BLADDER CATHETER: CPT

## 2021-04-16 LAB
APPEARANCE: CLEAR
BACTERIA: NEGATIVE
BILIRUBIN URINE: NEGATIVE
BLOOD URINE: NEGATIVE
COLOR: COLORLESS
GLUCOSE QUALITATIVE U: NEGATIVE
HYALINE CASTS: 0 /LPF
KETONES URINE: NEGATIVE
LEUKOCYTE ESTERASE URINE: NEGATIVE
MICROSCOPIC-UA: NORMAL
NITRITE URINE: NEGATIVE
PH URINE: 6
PROTEIN URINE: NEGATIVE
RED BLOOD CELLS URINE: 0 /HPF
SPECIFIC GRAVITY URINE: 1.01
SQUAMOUS EPITHELIAL CELLS: 0 /HPF
UROBILINOGEN URINE: NORMAL
WHITE BLOOD CELLS URINE: 0 /HPF

## 2021-04-16 NOTE — HISTORY OF PRESENT ILLNESS
[FreeTextEntry1] : Pt presents reporting 2-3 week h/o spasms/cramping after BM's.  Feels pressure and tightening/contractions/cramping in stomach after urinating.  +dysuria "a little bit".  +Frequency.  +Urgency.  +Chills. Denies fever.  +foul odor to urine "I think".  No hematuria.  No new or worsening of pre-existing back pain.   \par \par takes unknown medication suggested by her  GI which helps but "I want  to make sure symptoms are not urine related"\par .  \par Cathed specimen obtained and sent for UA/CS- will wait for result and tx if necessary\par   \par \par \par

## 2021-04-19 LAB — BACTERIA UR CULT: NORMAL

## 2021-06-04 ENCOUNTER — APPOINTMENT (OUTPATIENT)
Dept: ENDOCRINOLOGY | Facility: CLINIC | Age: 81
End: 2021-06-04
Payer: MEDICARE

## 2021-06-04 VITALS
HEIGHT: 58 IN | WEIGHT: 142 LBS | BODY MASS INDEX: 29.81 KG/M2 | HEART RATE: 70 BPM | SYSTOLIC BLOOD PRESSURE: 122 MMHG | DIASTOLIC BLOOD PRESSURE: 80 MMHG

## 2021-06-04 DIAGNOSIS — I10 ESSENTIAL (PRIMARY) HYPERTENSION: ICD-10-CM

## 2021-06-04 PROCEDURE — 99214 OFFICE O/P EST MOD 30 MIN: CPT

## 2021-06-04 NOTE — HISTORY OF PRESENT ILLNESS
[FreeTextEntry1] : Interval hitsory: has been feeling sick for at least the past two weeks. Getting dizzy. Diagnosed with an ear infection and started on antibiotics two days ago. Very stressed about her symptoms. Reports chills. Denies fever.\par \par Quality: B[place]'s Disease\par Severity: moderate\par Duration: over 13 years\par Onset: weight loss and labs \par Modifying Factors: Better with medication \par Associated Symptoms: +fatigue, nervous, head is foggy, denies darkening of the skin, no low blood pressure \par \par Current Regimen:\par HC 10 mg in AM and 5 mg in PM \par \par Also with hypothyroidism, on LT4 88 mcg daily, takes in AM, sometimes with food and sometimes with other medication. Dose last adjusted from 100 mcg daily in November 2020 for TSH 0.17. \par \par Prediabetes\par Diagnosed with A1c 6.0% in July 2019.\par Has been working on lifestyle management.\par \par Total hysterectomy 06/2020

## 2021-06-04 NOTE — REVIEW OF SYSTEMS
[Chills] : chills [Joint Pain] : joint pain [Hair Loss] : hair loss [Dizziness] : dizziness [Depression] : depression [Anxiety] : anxiety [Fever] : no fever [Dysphagia] : no dysphagia [Neck Pain] : no neck pain [Chest Pain] : no chest pain [Palpitations] : no palpitations [Shortness Of Breath] : no shortness of breath [Nausea] : no nausea [Constipation] : no constipation [Abdominal Pain] : no abdominal pain [Vomiting] : no vomiting [Diarrhea] : no diarrhea [Polyuria] : no polyuria [Dry Skin] : no dry skin [Tremors] : no tremors [Swelling] : no swelling

## 2021-06-04 NOTE — ASSESSMENT
[FreeTextEntry1] : 80 year old female with [place]'s disease, Hypothyroidism, hyperlipidemia, and prediabetes. \par \par 1. Titus's disease\par -While on antibiotics, increase HC 10 mg to 2 tabs in AM an 1 tab in PM. Once antibiotics are complete, can decrease back to usual dosing (HC 10 mg, 1 tab in AM and 0.5 tab in PM).\par \par 2. Hypothyroidism- TSH 6.73.\par -Continue current dose of LT4.\par -Reviewed appropriate administration of LT4.\par -Repeat TFTs in 4 weeks. If still elevated, can increase dose of LT4.\par \par 3. Hyperlipidemia- discontinued statin, now with Chol 269 and \par -Resume simvastatin 10 mg daily HS.\par -Repeat lipids with next labs.\par \par 4. Prediabetes\par -Discussed importance of lifestyle modifications- diet, exercise, and weight loss- to maintain glycemic control.\par -Repeat A1c in 3 months.\par

## 2021-06-04 NOTE — PHYSICAL EXAM
[Alert] : alert [Well Nourished] : well nourished [No Acute Distress] : no acute distress [Well Developed] : well developed [Normal Sclera/Conjunctiva] : normal sclera/conjunctiva [EOMI] : extra ocular movement intact [No Proptosis] : no proptosis [Thyroid Not Enlarged] : the thyroid was not enlarged [No Thyroid Nodules] : no palpable thyroid nodules [No Respiratory Distress] : no respiratory distress [No Accessory Muscle Use] : no accessory muscle use [Clear to Auscultation] : lungs were clear to auscultation bilaterally [Normal S1, S2] : normal S1 and S2 [Normal Rate] : heart rate was normal [Regular Rhythm] : with a regular rhythm [No Edema] : no peripheral edema [Normal Anterior Cervical Nodes] : no anterior cervical lymphadenopathy [Normal Posterior Cervical Nodes] : no posterior cervical lymphadenopathy [Oriented x3] : oriented to person, place, and time [Normal Affect] : the affect was normal [Normal Mood] : the mood was normal [Acanthosis Nigricans] : no acanthosis nigricans [de-identified] : short stature

## 2021-06-22 ENCOUNTER — RX RENEWAL (OUTPATIENT)
Age: 81
End: 2021-06-22

## 2021-07-09 ENCOUNTER — APPOINTMENT (OUTPATIENT)
Dept: ENDOCRINOLOGY | Facility: CLINIC | Age: 81
End: 2021-07-09

## 2021-07-12 ENCOUNTER — APPOINTMENT (OUTPATIENT)
Dept: ENDOCRINOLOGY | Facility: CLINIC | Age: 81
End: 2021-07-12
Payer: MEDICARE

## 2021-07-12 VITALS
DIASTOLIC BLOOD PRESSURE: 62 MMHG | HEIGHT: 58 IN | HEART RATE: 67 BPM | SYSTOLIC BLOOD PRESSURE: 128 MMHG | WEIGHT: 143 LBS | OXYGEN SATURATION: 95 % | BODY MASS INDEX: 30.02 KG/M2

## 2021-07-12 PROCEDURE — 99214 OFFICE O/P EST MOD 30 MIN: CPT

## 2021-07-12 NOTE — PHYSICAL EXAM
[Alert] : alert [Well Nourished] : well nourished [No Acute Distress] : no acute distress [Well Developed] : well developed [Normal Sclera/Conjunctiva] : normal sclera/conjunctiva [EOMI] : extra ocular movement intact [No Proptosis] : no proptosis [Thyroid Not Enlarged] : the thyroid was not enlarged [No Thyroid Nodules] : no palpable thyroid nodules [No Respiratory Distress] : no respiratory distress [No Accessory Muscle Use] : no accessory muscle use [Clear to Auscultation] : lungs were clear to auscultation bilaterally [Normal S1, S2] : normal S1 and S2 [Normal Rate] : heart rate was normal [Regular Rhythm] : with a regular rhythm [No Edema] : no peripheral edema [Normal Anterior Cervical Nodes] : no anterior cervical lymphadenopathy [Oriented x3] : oriented to person, place, and time [Normal Affect] : the affect was normal [Normal Mood] : the mood was normal [Acanthosis Nigricans] : no acanthosis nigricans [de-identified] : short stature

## 2021-07-12 NOTE — ASSESSMENT
[FreeTextEntry1] : 80 year old female with [place]'s disease, Hypothyroidism, hyperlipidemia, and prediabetes. \par \par 1. Titus's disease\par -Cont current RX  10 mg iN AM and 5mg in PM \par \par 2. Hypothyroidism- TSH 6.73.\par -Continue current dose of LT4.\par -Reviewed appropriate administration of LT4.\par  .\par \par 3. Hyperlipidemia- discontinued statin, now with Chol 269 and \par Cont  simvastatin 10 mg daily HS.\par .\par \par 4. Prediabetes\par -Discussed importance of lifestyle modifications- diet, exercise, and weight loss- to maintain glycemic control.\par -Repeat A1c in 3 months.\par \par \par 5> dizziness/vertigo  follow up with ENT  rajani vail today and repeat CBC CMP  and TFT\par \par  6. IncPlatelets- will  recheck today  never saw heme/onc bc was afraid of being told she had cancer\par \par  In cplatelet to 703 from ay labs- see Heme onc\par \par

## 2021-07-12 NOTE — HISTORY OF PRESENT ILLNESS
[FreeTextEntry1] : Interval hitsory: had surgery with Dr Tuttle - felt great \par then a few months ago developed dizziness   thought  due to inner ea r infeciotn \par Quality: [place]'s Disease\par Hypotyroidism \par Severity: moderate\par Duration: over 13 years\par Onset: weight loss and labs \par Modifying Factors: Better with medication \par Associated Symptoms: +fatigue, nervous, head is foggy, denies darkening of the skin, no low blood pressure \par \par Current Regimen:\par HC 10 mg in AM and 5 mg in PM \par \par Also with hypothyroidism, on LT4 88 mcg daily, takes in AM, \par Prediabetes\par Diagnosed with A1c 6.0% in July 2019.\par Has been working on lifestyle management.\par \par Total hysterectomy 06/2020

## 2021-07-14 LAB
ALBUMIN SERPL ELPH-MCNC: 4.7 G/DL
ALP BLD-CCNC: 75 U/L
ALT SERPL-CCNC: 20 U/L
ANION GAP SERPL CALC-SCNC: 12 MMOL/L
AST SERPL-CCNC: 27 U/L
BASOPHILS # BLD AUTO: 0.15 K/UL
BASOPHILS NFR BLD AUTO: 1.5 %
BILIRUB SERPL-MCNC: 0.3 MG/DL
BUN SERPL-MCNC: 24 MG/DL
CALCIUM SERPL-MCNC: 10.5 MG/DL
CHLORIDE SERPL-SCNC: 100 MMOL/L
CO2 SERPL-SCNC: 25 MMOL/L
CREAT SERPL-MCNC: 1.17 MG/DL
EOSINOPHIL # BLD AUTO: 0.5 K/UL
EOSINOPHIL NFR BLD AUTO: 5.2 %
GLUCOSE SERPL-MCNC: 79 MG/DL
HCT VFR BLD CALC: 46.1 %
HGB BLD-MCNC: 13.9 G/DL
IMM GRANULOCYTES NFR BLD AUTO: 0.2 %
LYMPHOCYTES # BLD AUTO: 3.47 K/UL
LYMPHOCYTES NFR BLD AUTO: 35.8 %
MAGNESIUM SERPL-MCNC: 2.1 MG/DL
MAN DIFF?: NORMAL
MCHC RBC-ENTMCNC: 25.2 PG
MCHC RBC-ENTMCNC: 30.2 GM/DL
MCV RBC AUTO: 83.7 FL
MONOCYTES # BLD AUTO: 0.87 K/UL
MONOCYTES NFR BLD AUTO: 9 %
NEUTROPHILS # BLD AUTO: 4.67 K/UL
NEUTROPHILS NFR BLD AUTO: 48.3 %
PHOSPHATE SERPL-MCNC: 4.1 MG/DL
PLATELET # BLD AUTO: 649 K/UL
POTASSIUM SERPL-SCNC: 5.1 MMOL/L
PROT SERPL-MCNC: 8 G/DL
RBC # BLD: 5.51 M/UL
RBC # FLD: 15.9 %
SODIUM SERPL-SCNC: 138 MMOL/L
T3FREE SERPL-MCNC: 2.48 PG/ML
T4 FREE SERPL-MCNC: 1.6 NG/DL
TSH SERPL-ACNC: 4.89 UIU/ML
WBC # FLD AUTO: 9.68 K/UL

## 2021-08-04 NOTE — HISTORY OF PRESENT ILLNESS
[Home] : at home, [unfilled] , at the time of the visit. [Other Location: e.g. Home (Enter Location, City,State)___] : at [unfilled] [FreeTextEntry1] : Pt. had a bladder lift on 6/16/20 with Dr. Foley. \par \par Quality: Titus's Disease\par Severity: moderate\par Duration: over 13 years\par Onset: weight loss and labs \par Modifying Factors: Better with medication \par Associated Symptoms: +fatigue, nervous, head is foggy,  denies darkening of the skin, no low blood pressure \par \par \par Current Regimen:\par  HC 10 mg in AM and 5 mg in PM \par - LT4 88 mcg daily- taking with Tea\par \par Labs reviewed: \par 6/13/20\par A1C 6.0\par Cholesterol 307\par \par Triglycerides 177\par TSH 30\par Free T4 1.1\par \par Diet: uncontrolled diet, eating everything [Verbal consent obtained from patient] : the patient, [unfilled] show

## 2021-08-24 ENCOUNTER — NON-APPOINTMENT (OUTPATIENT)
Age: 81
End: 2021-08-24

## 2021-08-25 ENCOUNTER — RESULT CHARGE (OUTPATIENT)
Age: 81
End: 2021-08-25

## 2021-08-25 ENCOUNTER — APPOINTMENT (OUTPATIENT)
Dept: UROGYNECOLOGY | Facility: CLINIC | Age: 81
End: 2021-08-25
Payer: MEDICARE

## 2021-08-25 DIAGNOSIS — N95.2 POSTMENOPAUSAL ATROPHIC VAGINITIS: ICD-10-CM

## 2021-08-25 PROCEDURE — 51701 INSERT BLADDER CATHETER: CPT

## 2021-08-25 PROCEDURE — 99213 OFFICE O/P EST LOW 20 MIN: CPT | Mod: 25

## 2021-08-25 NOTE — PHYSICAL EXAM
[No Acute Distress] : in no acute distress [Well developed] : well developed [Well Nourished] : ~L well nourished [Good Hygeine] : demonstrates good hygeine [Oriented x3] : oriented to person, place, and time [Normal Memory] : ~T memory was ~L unimpaired [Normal Mood/Affect] : mood and affect are normal [Soft, Nontender] : the abdomen was soft and nontender [None] : no CVA tenderness [Warm and Dry] : was warm and dry to touch [Normal Gait] : gait was normal [Vulvar Atrophy] : vulvar atrophy [Normal] : was normal [Normal Appearance] : general appearance was normal [Atrophy] : atrophy

## 2021-08-25 NOTE — HISTORY OF PRESENT ILLNESS
[FreeTextEntry1] : Pt presents to office with c/o left sided "ovary" pain x 4 days which is worse when she walks and is making it difficult for her to walk.  She has been taking motrin with some relief.  She also c/o some urinary frequency, unsure if it is increased from her baseline.  Denies any dysuria, blood in the urine, back pain, fever or chills.  Also notes some vaginal dryness.  She states she was given vaginal estrogen cream in the past but has not started using this.

## 2021-08-25 NOTE — DISCUSSION/SUMMARY
[FreeTextEntry1] : In office dip negative.  We discussed that her left sided pain may be MS in nature as it is made worse with ambulation.  I advised motrin PRN and heat/ice as needed.  F/u with PMD if it persists.  She has rx for vaginal estrogen and we discussed the risks/benefits and she will start this.  Instructed to call with any questions or concerns and she verbalizes understanding.

## 2021-08-25 NOTE — PROCEDURE
[Straight Catheterization] : insertion of a straight catheter [Urinary Frequency] : urinary frequency [Patient] : the patient [___ Fr Straight Tip] : a [unfilled] in [place] straight tip catheter [None] : there were no complications with the catheter insertion [Clear] : clear [No Complications] : no complications [Tolerated Well] : the patient tolerated the procedure well [Post procedure instructions and information given] : Post procedure instructions and information were given and reviewed with patient. [0] : 0

## 2021-09-12 ENCOUNTER — RX RENEWAL (OUTPATIENT)
Age: 81
End: 2021-09-12

## 2021-09-14 ENCOUNTER — APPOINTMENT (OUTPATIENT)
Dept: UROGYNECOLOGY | Facility: CLINIC | Age: 81
End: 2021-09-14

## 2021-09-14 ENCOUNTER — NON-APPOINTMENT (OUTPATIENT)
Age: 81
End: 2021-09-14

## 2021-09-16 LAB
APPEARANCE: CLEAR
BACTERIA: NEGATIVE
BILIRUBIN URINE: NEGATIVE
BLOOD URINE: NEGATIVE
COLOR: COLORLESS
GLUCOSE QUALITATIVE U: NEGATIVE
HYALINE CASTS: 0 /LPF
KETONES URINE: NEGATIVE
LEUKOCYTE ESTERASE URINE: NEGATIVE
MICROSCOPIC-UA: NORMAL
NITRITE URINE: NEGATIVE
PH URINE: 6.5
PROTEIN URINE: NEGATIVE
RED BLOOD CELLS URINE: 1 /HPF
SPECIFIC GRAVITY URINE: 1.01
SQUAMOUS EPITHELIAL CELLS: 0 /HPF
UROBILINOGEN URINE: NORMAL
WHITE BLOOD CELLS URINE: 0 /HPF

## 2021-10-06 PROBLEM — I10 ESSENTIAL HYPERTENSION: Status: ACTIVE | Noted: 2020-10-08

## 2021-10-29 ENCOUNTER — APPOINTMENT (OUTPATIENT)
Dept: ENDOCRINOLOGY | Facility: CLINIC | Age: 81
End: 2021-10-29
Payer: MEDICARE

## 2021-10-29 VITALS
WEIGHT: 136 LBS | DIASTOLIC BLOOD PRESSURE: 62 MMHG | HEIGHT: 58 IN | BODY MASS INDEX: 28.55 KG/M2 | HEART RATE: 61 BPM | SYSTOLIC BLOOD PRESSURE: 120 MMHG

## 2021-10-29 PROCEDURE — 99214 OFFICE O/P EST MOD 30 MIN: CPT

## 2021-11-02 ENCOUNTER — NON-APPOINTMENT (OUTPATIENT)
Age: 81
End: 2021-11-02

## 2021-11-02 NOTE — HISTORY OF PRESENT ILLNESS
[FreeTextEntry1] : recently hospitalized  Hyponatremia  ? sepsiss- pt  had HC increased to 20/10 \par \par Quality: Titus's Disease\par Hypotyroidism \par Severity: moderate\par Duration: over 13 years\par Onset: weight loss and labs \par Modifying Factors: Better with medication \par Associated Symptoms: +fatigue, nervous, head is foggy, denies darkening of the skin, no low blood pressure \par \par Current Regimen:\par HC 20 mg in AM and 10 mg in PM \par \par Also with hypothyroidism, on LT4 88 mcg daily, takes in AM, \par Prediabetes\par Diagnosed with A1c 6.0% in July 2019.\par Has been working on lifestyle management.\par \par Total hysterectomy 06/2020

## 2021-11-02 NOTE — PHYSICAL EXAM
[Alert] : alert [Well Nourished] : well nourished [No Acute Distress] : no acute distress [Well Developed] : well developed [Normal Sclera/Conjunctiva] : normal sclera/conjunctiva [EOMI] : extra ocular movement intact [No Proptosis] : no proptosis [Thyroid Not Enlarged] : the thyroid was not enlarged [No Thyroid Nodules] : no palpable thyroid nodules [No Respiratory Distress] : no respiratory distress [No Accessory Muscle Use] : no accessory muscle use [Clear to Auscultation] : lungs were clear to auscultation bilaterally [Normal S1, S2] : normal S1 and S2 [Normal Rate] : heart rate was normal [Regular Rhythm] : with a regular rhythm [No Edema] : no peripheral edema [Normal Anterior Cervical Nodes] : no anterior cervical lymphadenopathy [Oriented x3] : oriented to person, place, and time [Normal Affect] : the affect was normal [Normal Mood] : the mood was normal [Acanthosis Nigricans] : no acanthosis nigricans [de-identified] : short stature

## 2021-11-02 NOTE — ASSESSMENT
[FreeTextEntry1] : 80 year old female with [place]'s disease, Hypothyroidism, hyperlipidemia, and prediabetes. \par \par 1. Titus's disease\par -Cont current RX  20 mg iN AM and 10mg in PM \par check labs next week \par \par 2. Hypothyroidism- TSH 6.73.\par -Continue current dose of LT4.\par -Reviewed appropriate administration of LT4.\par  .\par \par 3. Hyperlipidemia- discontinued statin, now with Chol 269 and \par Cont  simvastatin 10 mg daily HS.\par .\par \par 4. Prediabetes\par -Discussed importance of lifestyle modifications- diet, exercise, and weight loss- to maintain glycemic control.\par -Repeat A1c in 3 months.\par \par \par 5> dizziness/vertigo  follow up with ENT  rajani vail today and repeat CBC CMP  and TFT\par \par  6. IncPlatelets- will  recheck today  never saw heme/onc bc was afraid of being told she had cancer\par \par  In cplatelet to 703 from ay labs- see Heme onc\par \par

## 2021-11-05 ENCOUNTER — APPOINTMENT (OUTPATIENT)
Dept: ENDOCRINOLOGY | Facility: CLINIC | Age: 81
End: 2021-11-05

## 2021-11-09 ENCOUNTER — APPOINTMENT (OUTPATIENT)
Dept: UROGYNECOLOGY | Facility: CLINIC | Age: 81
End: 2021-11-09

## 2021-11-23 ENCOUNTER — APPOINTMENT (OUTPATIENT)
Dept: UROGYNECOLOGY | Facility: CLINIC | Age: 81
End: 2021-11-23

## 2021-12-13 ENCOUNTER — RESULT CHARGE (OUTPATIENT)
Age: 81
End: 2021-12-13

## 2021-12-13 ENCOUNTER — APPOINTMENT (OUTPATIENT)
Dept: UROGYNECOLOGY | Facility: CLINIC | Age: 81
End: 2021-12-13
Payer: MEDICARE

## 2021-12-13 DIAGNOSIS — N81.9 FEMALE GENITAL PROLAPSE, UNSPECIFIED: ICD-10-CM

## 2021-12-13 DIAGNOSIS — R10.2 PELVIC AND PERINEAL PAIN: ICD-10-CM

## 2021-12-13 DIAGNOSIS — R35.0 FREQUENCY OF MICTURITION: ICD-10-CM

## 2021-12-13 LAB
BILIRUB UR QL STRIP: NEGATIVE
CLARITY UR: CLEAR
COLLECTION METHOD: NORMAL
GLUCOSE UR-MCNC: NEGATIVE
HCG UR QL: 0.2 EU/DL
HGB UR QL STRIP.AUTO: NEGATIVE
KETONES UR-MCNC: NEGATIVE
LEUKOCYTE ESTERASE UR QL STRIP: NEGATIVE
NITRITE UR QL STRIP: NEGATIVE
PH UR STRIP: 6.5
PROT UR STRIP-MCNC: NEGATIVE
SP GR UR STRIP: 1.01

## 2021-12-13 PROCEDURE — 99213 OFFICE O/P EST LOW 20 MIN: CPT | Mod: 25

## 2021-12-13 PROCEDURE — 51798 US URINE CAPACITY MEASURE: CPT

## 2021-12-13 NOTE — DISCUSSION/SUMMARY
[FreeTextEntry1] : Linda is complaining of sensation of prolapse but she is not sure if it is her vagina or rectum. She does report it happening mostly with BM. Her vaginal prolapse repair seems to be holding up pretty well. We discussed that this may in-fact be a rectal prolapse. I referred her to Dr Geronimo. Linda does not want an DAREN if possible.

## 2021-12-13 NOTE — HISTORY OF PRESENT ILLNESS
[FreeTextEntry1] : Had TVH, colpectomy and sling 6/2020. Was doing very well, denies any leaking of urine. She had been constipated and a couple of weeks ago had to strain for BM and noticed an uncomfortable protrusion. She feels pressure until it gets reduced and she sits to try to get it back in. She is really not sure if it is coming from her vagina or from her rectum. Not taking anything to keep bowels soft.

## 2021-12-13 NOTE — PHYSICAL EXAM
[Chaperone Present] : A chaperone was present in the examining room during all aspects of the physical examination [Normal Appearance] : general appearance was normal [C ____] : C [unfilled] [Absent] : absent [Normal] : no abnormalities [FreeTextEntry4] : TVL 3cm

## 2021-12-13 NOTE — REASON FOR VISIT
[Pelvic Organ Prolapse] : pelvic organ prolapse [Problems With Defecation] : problems with defecation

## 2021-12-20 ENCOUNTER — APPOINTMENT (OUTPATIENT)
Dept: UROGYNECOLOGY | Facility: CLINIC | Age: 81
End: 2021-12-20

## 2022-01-03 ENCOUNTER — APPOINTMENT (OUTPATIENT)
Dept: COLORECTAL SURGERY | Facility: CLINIC | Age: 82
End: 2022-01-03
Payer: MEDICARE

## 2022-01-03 DIAGNOSIS — Z80.8 FAMILY HISTORY OF MALIGNANT NEOPLASM OF OTHER ORGANS OR SYSTEMS: ICD-10-CM

## 2022-01-03 PROCEDURE — 99205 OFFICE O/P NEW HI 60 MIN: CPT

## 2022-01-03 RX ORDER — ESTRADIOL 0.1 MG/G
0.1 CREAM VAGINAL
Qty: 1 | Refills: 0 | Status: COMPLETED | COMMUNITY
Start: 2021-01-22 | End: 2022-01-03

## 2022-01-03 RX ORDER — BENZONATATE 100 MG/1
100 CAPSULE ORAL
Qty: 42 | Refills: 0 | Status: COMPLETED | COMMUNITY
Start: 2020-11-18 | End: 2022-01-03

## 2022-01-03 RX ORDER — AMIODARONE HYDROCHLORIDE 200 MG/1
200 TABLET ORAL
Qty: 60 | Refills: 0 | Status: ACTIVE | COMMUNITY
Start: 2021-09-22

## 2022-01-03 RX ORDER — APIXABAN 2.5 MG/1
2.5 TABLET, FILM COATED ORAL
Qty: 60 | Refills: 0 | Status: ACTIVE | COMMUNITY
Start: 2021-09-22

## 2022-01-03 RX ORDER — HYDROXYUREA 500 MG/1
500 CAPSULE ORAL
Qty: 30 | Refills: 0 | Status: ACTIVE | COMMUNITY
Start: 2021-12-17

## 2022-01-03 RX ORDER — PANTOPRAZOLE 40 MG/1
40 TABLET, DELAYED RELEASE ORAL
Qty: 30 | Refills: 0 | Status: ACTIVE | COMMUNITY
Start: 2021-09-22

## 2022-01-03 RX ORDER — CIPROFLOXACIN HYDROCHLORIDE 500 MG/1
500 TABLET, FILM COATED ORAL
Qty: 14 | Refills: 0 | Status: COMPLETED | COMMUNITY
Start: 2021-02-11 | End: 2022-01-03

## 2022-01-03 RX ORDER — NYSTATIN AND TRIAMCINOLONE ACETONIDE 100000; 1 [USP'U]/G; MG/G
100000-0.1 OINTMENT TOPICAL 3 TIMES DAILY
Qty: 2 | Refills: 2 | Status: COMPLETED | COMMUNITY
Start: 2019-10-25 | End: 2022-01-03

## 2022-01-03 NOTE — HISTORY OF PRESENT ILLNESS
[FreeTextEntry1] : 81-year-old female who presents for consultation for rectal prolapse.  She noticed her symptoms several months ago after being constipated.  A few months later, she reports recurrent bulging of tissue from the rectum which causes discomfort and rectal bleeding.  The prolapse reduces spontaneously and with certain positions.  She often strains with bowel movements and uses MiraLAX intermittently to help with this.  Her bowel movements are daily.  Last colonoscopy was approximately 10 years ago by Dr. Loomis reportedly normal.  She underwent stool based colon cancer screening test last year which was negative.  She has a history of pelvic organ prolapse and underwent a transvaginal hysterectomy, corpectomy and sling in June 2020 by Dr. Tuttle.  She has been doing very well from a urinary standpoint and has no current symptoms.\par \par She has a history of adrenal insufficiency and is on 20/10 mg of prednisone twice daily.  She was recently diagnosed with what seems to be atrial fibrillation based on her description during hospitalization in [place] at [facility] [place] and was started on Eliquis and a baby aspirin.

## 2022-01-03 NOTE — CONSULT LETTER
[Dear  ___] : Dear  [unfilled], [Consult Letter:] : I had the pleasure of evaluating your patient, [unfilled]. [Please see my note below.] : Please see my note below. [Consult Closing:] : Thank you very much for allowing me to participate in the care of this patient.  If you have any questions, please do not hesitate to contact me. [Sincerely,] : Sincerely, [FreeTextEntry1] : She has rectal prolapse and I recommend surgical correction of this in the form of a rectopexy. [FreeTextEntry3] : Alton Sykes MD\par  [DrTaylor  ___] : Dr. SHAW [DrTaylor ___] : Dr. SHAW

## 2022-01-03 NOTE — PHYSICAL EXAM
[Respiratory Effort] : normal respiratory effort [Normal Rate and Rhythm] : normal rate and rhythm [Calm] : calm [de-identified] : Soft, nontender, nondistended. [de-identified] : Full-thickness rectal prolapse of about 6 cm, easily reduced. [de-identified] : Well-appearing, in no distress [de-identified] : Normocephalic, atraumatic [de-identified] : Moves extremities without difficulty [de-identified] : Warm and dry [de-identified] : Alert and oriented x3

## 2022-01-05 ENCOUNTER — NON-APPOINTMENT (OUTPATIENT)
Age: 82
End: 2022-01-05

## 2022-01-13 ENCOUNTER — NON-APPOINTMENT (OUTPATIENT)
Age: 82
End: 2022-01-13

## 2022-02-14 ENCOUNTER — RX RENEWAL (OUTPATIENT)
Age: 82
End: 2022-02-14

## 2022-02-21 ENCOUNTER — LABORATORY RESULT (OUTPATIENT)
Age: 82
End: 2022-02-21

## 2022-02-21 ENCOUNTER — APPOINTMENT (OUTPATIENT)
Dept: ENDOCRINOLOGY | Facility: CLINIC | Age: 82
End: 2022-02-21
Payer: MEDICARE

## 2022-02-21 VITALS
WEIGHT: 140 LBS | BODY MASS INDEX: 29.39 KG/M2 | HEART RATE: 95 BPM | HEIGHT: 58 IN | SYSTOLIC BLOOD PRESSURE: 125 MMHG | OXYGEN SATURATION: 64 % | DIASTOLIC BLOOD PRESSURE: 80 MMHG

## 2022-02-21 DIAGNOSIS — E55.9 VITAMIN D DEFICIENCY, UNSPECIFIED: ICD-10-CM

## 2022-02-21 DIAGNOSIS — R39.9 UNSPECIFIED SYMPTOMS AND SIGNS INVOLVING THE GENITOURINARY SYSTEM: ICD-10-CM

## 2022-02-21 DIAGNOSIS — R73.01 IMPAIRED FASTING GLUCOSE: ICD-10-CM

## 2022-02-21 DIAGNOSIS — E03.9 HYPOTHYROIDISM, UNSPECIFIED: ICD-10-CM

## 2022-02-21 DIAGNOSIS — E78.5 HYPERLIPIDEMIA, UNSPECIFIED: ICD-10-CM

## 2022-02-21 DIAGNOSIS — E27.40 UNSPECIFIED ADRENOCORTICAL INSUFFICIENCY: ICD-10-CM

## 2022-02-21 DIAGNOSIS — E53.8 DEFICIENCY OF OTHER SPECIFIED B GROUP VITAMINS: ICD-10-CM

## 2022-02-21 DIAGNOSIS — R79.89 OTHER SPECIFIED ABNORMAL FINDINGS OF BLOOD CHEMISTRY: ICD-10-CM

## 2022-02-21 PROCEDURE — 36415 COLL VENOUS BLD VENIPUNCTURE: CPT

## 2022-02-21 PROCEDURE — 99214 OFFICE O/P EST MOD 30 MIN: CPT | Mod: 25

## 2022-02-21 RX ORDER — DICYCLOMINE HYDROCHLORIDE 20 MG/1
20 TABLET ORAL
Qty: 120 | Refills: 0 | Status: DISCONTINUED | COMMUNITY
Start: 2021-05-14

## 2022-02-21 RX ORDER — METHOCARBAMOL 500 MG/1
500 TABLET, FILM COATED ORAL
Qty: 20 | Refills: 0 | Status: DISCONTINUED | COMMUNITY
Start: 2021-10-08

## 2022-02-21 RX ORDER — CEFPODOXIME PROXETIL 100 MG/1
100 TABLET, FILM COATED ORAL
Qty: 20 | Refills: 0 | Status: DISCONTINUED | COMMUNITY
Start: 2021-09-04

## 2022-02-21 RX ORDER — ASPIRIN 81 MG/1
81 TABLET, COATED ORAL
Qty: 30 | Refills: 0 | Status: DISCONTINUED | COMMUNITY
Start: 2021-09-22

## 2022-02-21 RX ORDER — HYDROCORTISONE 5 MG/1
5 TABLET ORAL
Qty: 20 | Refills: 0 | Status: DISCONTINUED | COMMUNITY
Start: 2021-10-05

## 2022-02-21 RX ORDER — ONDANSETRON 4 MG/1
4 TABLET ORAL
Qty: 20 | Refills: 0 | Status: DISCONTINUED | COMMUNITY
Start: 2021-09-04

## 2022-02-21 NOTE — REASON FOR VISIT
[Follow - Up] : a follow-up visit [Adrenal Evaluation/Adrenal Disorder] : adrenal evaluation/adrenal disorder [Hypothyroidism] : hypothyroidism [Other___] : [unfilled] [Family Member] : family member [Other: _____] : [unfilled]

## 2022-02-21 NOTE — ASSESSMENT
[FreeTextEntry1] : 81 year old female with [place]'s disease, Hypothyroidism, hyperlipidemia, and prediabetes. \par \par 1. Titus's disease\par -Cont current RX  20 mg iN AM and 10mg in PM \par check labs now in office \par \par 2. Hypothyroidism- check TFTs in office \par -Continue current dose of LT4.\par -Reviewed appropriate administration of LT4.\par \par 3. Hyperlipidemia- check lipids in office \par Cont  simvastatin 10 mg daily HS.\par \par 4. Prediabetes\par -Discussed importance of lifestyle modifications- diet, exercise, and weight loss- to maintain glycemic control.\par -Repeat A1c now in office \par \par 5. History Increased Platelets- continue to follow up with heme/onc [facility] \par \par R/o UTI - check urine culture and urinalysis \par \par \par RTO in April with Dr. Garcia  [Levothyroxine] : The patient was instructed to take Levothyroxine on an empty stomach, separate from vitamins, and wait at least 30 minutes before eating

## 2022-02-21 NOTE — REVIEW OF SYSTEMS
[Blurred Vision] : blurred vision [Hair Loss] : hair loss [Anxiety] : anxiety [Fatigue] : no fatigue [Decreased Appetite] : appetite not decreased [Recent Weight Gain (___ Lbs)] : no recent weight gain [Recent Weight Loss (___ Lbs)] : no recent weight loss [Visual Field Defect] : no visual field defect [Dysphagia] : no dysphagia [Neck Pain] : no neck pain [Dysphonia] : no dysphonia [Chest Pain] : no chest pain [Palpitations] : no palpitations [Constipation] : no constipation [Diarrhea] : no diarrhea [Polyuria] : no polyuria [Dysuria] : no dysuria [Dry Skin] : no dry skin [Tremors] : no tremors [Headaches] : no headaches [Depression] : no depression [Polydipsia] : no polydipsia [Cold Intolerance] : no cold intolerance [Heat Intolerance] : no heat intolerance [Swelling] : no swelling [FreeTextEntry2] : weight stable  [FreeTextEntry3] : seen eye doctor 2 weeks ago, needs cataracts surgery

## 2022-02-21 NOTE — PHYSICAL EXAM
[Alert] : alert [Well Nourished] : well nourished [No Acute Distress] : no acute distress [Well Developed] : well developed [Normal Sclera/Conjunctiva] : normal sclera/conjunctiva [No Proptosis] : no proptosis [No LAD] : no lymphadenopathy [Thyroid Not Enlarged] : the thyroid was not enlarged [No Thyroid Nodules] : no palpable thyroid nodules [No Respiratory Distress] : no respiratory distress [No Accessory Muscle Use] : no accessory muscle use [Normal Rate and Effort] : normal respiratory rate and effort [Clear to Auscultation] : lungs were clear to auscultation bilaterally [Normal S1, S2] : normal S1 and S2 [Normal Rate] : heart rate was normal [Regular Rhythm] : with a regular rhythm [Normal Bowel Sounds] : normal bowel sounds [Not Tender] : non-tender [Soft] : abdomen soft [No Rash] : no rash [Acanthosis Nigricans] : no acanthosis nigricans [No Tremors] : no tremors [Oriented x3] : oriented to person, place, and time [Normal Affect] : the affect was normal [Normal Insight/Judgement] : insight and judgment were intact [Normal Mood] : the mood was normal

## 2022-02-21 NOTE — HISTORY OF PRESENT ILLNESS
[FreeTextEntry1] : In December in the [facility] due to low sodium and infection. She also needs to see the rectal surgeon again for possible surgery. \par \par Quality: [place]'s Disease\par Hypothyroidism \par Severity: moderate\par Duration: over 13 years\par Onset: weight loss and labs \par Modifying Factors: Better with medication \par Associated Symptoms: +fatigue, nervous, head is foggy, denies darkening of the skin, no low blood pressure \par \par Current Regimen:\par HC 20 mg in AM and 10 mg in PM \par \par Also with hypothyroidism, on LT4 88 mcg daily, takes in AM, \par Prediabetes\par Diagnosed with A1c 6.0% in July 2019.\par Has been working on lifestyle management.\par \par Total hysterectomy 06/2020

## 2022-02-22 LAB
25(OH)D3 SERPL-MCNC: 22.2 NG/ML
ACTH SER-ACNC: 12 PG/ML
ALBUMIN SERPL ELPH-MCNC: 4.3 G/DL
ALP BLD-CCNC: 69 U/L
ALT SERPL-CCNC: 14 U/L
ANION GAP SERPL CALC-SCNC: 13 MMOL/L
APPEARANCE: CLEAR
AST SERPL-CCNC: 21 U/L
BASOPHILS # BLD AUTO: 0.09 K/UL
BASOPHILS NFR BLD AUTO: 0.9 %
BILIRUB SERPL-MCNC: 0.2 MG/DL
BILIRUBIN URINE: NEGATIVE
BLOOD URINE: NEGATIVE
BUN SERPL-MCNC: 24 MG/DL
CALCIUM SERPL-MCNC: 9.7 MG/DL
CHLORIDE SERPL-SCNC: 102 MMOL/L
CHOLEST SERPL-MCNC: 322 MG/DL
CO2 SERPL-SCNC: 21 MMOL/L
COLOR: COLORLESS
CREAT SERPL-MCNC: 1.51 MG/DL
CREAT SPEC-SCNC: 33 MG/DL
EOSINOPHIL # BLD AUTO: 0.09 K/UL
EOSINOPHIL NFR BLD AUTO: 0.9 %
ESTIMATED AVERAGE GLUCOSE: 126 MG/DL
GLUCOSE QUALITATIVE U: NEGATIVE
GLUCOSE SERPL-MCNC: 100 MG/DL
HBA1C MFR BLD HPLC: 6 %
HCT VFR BLD CALC: 39.8 %
HDLC SERPL-MCNC: 50 MG/DL
HGB BLD-MCNC: 12.1 G/DL
KETONES URINE: NEGATIVE
LDLC SERPL CALC-MCNC: 233 MG/DL
LEUKOCYTE ESTERASE URINE: ABNORMAL
LYMPHOCYTES # BLD AUTO: 0.63 K/UL
LYMPHOCYTES NFR BLD AUTO: 6.1 %
MAGNESIUM SERPL-MCNC: 2.2 MG/DL
MAN DIFF?: NORMAL
MCHC RBC-ENTMCNC: 27.8 PG
MCHC RBC-ENTMCNC: 30.4 GM/DL
MCV RBC AUTO: 91.3 FL
MICROALBUMIN 24H UR DL<=1MG/L-MCNC: <1.2 MG/DL
MICROALBUMIN/CREAT 24H UR-RTO: NORMAL MG/G
MONOCYTES # BLD AUTO: 0.53 K/UL
MONOCYTES NFR BLD AUTO: 5.2 %
NEUTROPHILS # BLD AUTO: 8.64 K/UL
NEUTROPHILS NFR BLD AUTO: 84.3 %
NITRITE URINE: NEGATIVE
NONHDLC SERPL-MCNC: 272 MG/DL
PH URINE: 5.5
PHOSPHATE SERPL-MCNC: 3.1 MG/DL
PLATELET # BLD AUTO: 744 K/UL
POTASSIUM SERPL-SCNC: 4.8 MMOL/L
PROT SERPL-MCNC: 7.6 G/DL
PROTEIN URINE: NEGATIVE
RBC # BLD: 4.36 M/UL
RBC # FLD: 18.9 %
SODIUM SERPL-SCNC: 136 MMOL/L
SPECIFIC GRAVITY URINE: 1
T4 FREE SERPL-MCNC: 1.4 NG/DL
TRIGL SERPL-MCNC: 191 MG/DL
TSH SERPL-ACNC: 11.9 UIU/ML
UROBILINOGEN URINE: NORMAL
VIT B12 SERPL-MCNC: 374 PG/ML
WBC # FLD AUTO: 10.25 K/UL

## 2022-02-23 LAB — BACTERIA UR CULT: NORMAL

## 2022-03-02 ENCOUNTER — NON-APPOINTMENT (OUTPATIENT)
Age: 82
End: 2022-03-02

## 2022-03-04 ENCOUNTER — APPOINTMENT (OUTPATIENT)
Dept: COLORECTAL SURGERY | Facility: CLINIC | Age: 82
End: 2022-03-04
Payer: MEDICARE

## 2022-03-04 VITALS
OXYGEN SATURATION: 95 % | HEART RATE: 64 BPM | WEIGHT: 142 LBS | BODY MASS INDEX: 29.81 KG/M2 | SYSTOLIC BLOOD PRESSURE: 159 MMHG | HEIGHT: 58 IN | DIASTOLIC BLOOD PRESSURE: 83 MMHG

## 2022-03-04 DIAGNOSIS — K62.3 RECTAL PROLAPSE: ICD-10-CM

## 2022-03-04 PROCEDURE — 99214 OFFICE O/P EST MOD 30 MIN: CPT

## 2022-03-05 PROBLEM — K62.3 RECTAL PROLAPSE: Status: ACTIVE | Noted: 2022-01-03

## 2022-03-05 NOTE — HISTORY OF PRESENT ILLNESS
[FreeTextEntry1] : 81-year-old female who presents for follow up for rectal prolapse. Her symptoms have been present for several months. Prolapse is out most of the time reduces spontaneously and with certain positions.  She often strains with bowel movements and uses MiraLAX intermittently to help with this.  Her bowel movements are daily.  Last colonoscopy was approximately 10 years ago by Dr. Loomis reportedly normal.  She underwent stool based colon cancer screening test last year which was negative.  She has a history of pelvic organ prolapse and underwent a transvaginal hysterectomy and sling in June 2020 by Dr. Tuttle.  She has been doing very well from a urinary standpoint. \par \par I saw her in January but she did not schedule surgery due to Omicron. \par \par She has a history of adrenal insufficiency and is on 20/10 mg of prednisone twice daily.  She has been anticoagulated with Eliquis and aspirin after a hospitalization last year for possibly Afib. Unclear if she is still on this medication. Under the care of Dr. Canales, cardiologist.

## 2022-03-05 NOTE — PHYSICAL EXAM
[Respiratory Effort] : normal respiratory effort [Calm] : calm [de-identified] : Soft, nontender, nondistended. [de-identified] : Full-thickness rectal prolapse [de-identified] : Well-appearing, in no distress [de-identified] : Normocephalic, atraumatic [de-identified] : Moves extremities without difficulty [de-identified] : Warm and dry [de-identified] : Alert and oriented x3

## 2022-03-08 ENCOUNTER — NON-APPOINTMENT (OUTPATIENT)
Age: 82
End: 2022-03-08

## 2022-03-09 ENCOUNTER — NON-APPOINTMENT (OUTPATIENT)
Age: 82
End: 2022-03-09

## 2022-03-10 ENCOUNTER — RX RENEWAL (OUTPATIENT)
Age: 82
End: 2022-03-10

## 2022-04-13 ENCOUNTER — RX RENEWAL (OUTPATIENT)
Age: 82
End: 2022-04-13

## 2022-04-18 ENCOUNTER — RX RENEWAL (OUTPATIENT)
Age: 82
End: 2022-04-18

## 2022-04-18 RX ORDER — SIMVASTATIN 10 MG/1
10 TABLET, FILM COATED ORAL
Qty: 90 | Refills: 1 | Status: ACTIVE | COMMUNITY
Start: 2016-10-19 | End: 1900-01-01

## 2022-04-29 ENCOUNTER — APPOINTMENT (OUTPATIENT)
Dept: ENDOCRINOLOGY | Facility: CLINIC | Age: 82
End: 2022-04-29

## 2022-08-03 ENCOUNTER — RX RENEWAL (OUTPATIENT)
Age: 82
End: 2022-08-03

## 2022-09-09 ENCOUNTER — RX RENEWAL (OUTPATIENT)
Age: 82
End: 2022-09-09

## 2022-09-20 ENCOUNTER — APPOINTMENT (OUTPATIENT)
Dept: COLORECTAL SURGERY | Facility: CLINIC | Age: 82
End: 2022-09-20

## 2022-10-09 ENCOUNTER — RX RENEWAL (OUTPATIENT)
Age: 82
End: 2022-10-09

## 2022-10-09 RX ORDER — HYDROCORTISONE 20 MG/1
20 TABLET ORAL
Qty: 135 | Refills: 0 | Status: ACTIVE | COMMUNITY
Start: 2019-01-31 | End: 1900-01-01

## 2022-12-29 ENCOUNTER — APPOINTMENT (OUTPATIENT)
Dept: ENDOCRINOLOGY | Facility: CLINIC | Age: 82
End: 2022-12-29